# Patient Record
Sex: MALE | Race: BLACK OR AFRICAN AMERICAN | Employment: UNEMPLOYED | URBAN - METROPOLITAN AREA
[De-identification: names, ages, dates, MRNs, and addresses within clinical notes are randomized per-mention and may not be internally consistent; named-entity substitution may affect disease eponyms.]

---

## 2017-01-05 ENCOUNTER — HOSPITAL ENCOUNTER (OUTPATIENT)
Dept: MRI IMAGING | Age: 62
Discharge: HOME | End: 2017-01-05
Attending: INTERNAL MEDICINE
Payer: COMMERCIAL

## 2017-01-05 DIAGNOSIS — M51.37 DEGENERATIVE DISC DISEASE AT L5-S1 LEVEL: ICD-10-CM

## 2017-01-05 PROCEDURE — 72148 MRI LUMBAR SPINE W/O DYE: CPT

## 2017-05-04 ENCOUNTER — DOCUMENTATION ONLY (OUTPATIENT)
Dept: FAMILY MEDICINE CLINIC | Age: 62
End: 2017-05-04

## 2017-05-10 ENCOUNTER — OFFICE VISIT (OUTPATIENT)
Dept: FAMILY MEDICINE CLINIC | Age: 62
End: 2017-05-10

## 2017-05-10 ENCOUNTER — HOSPITAL ENCOUNTER (OUTPATIENT)
Dept: LAB | Age: 62
Discharge: HOME | End: 2017-05-10
Payer: COMMERCIAL

## 2017-05-10 VITALS
DIASTOLIC BLOOD PRESSURE: 105 MMHG | HEIGHT: 74 IN | BODY MASS INDEX: 24.77 KG/M2 | WEIGHT: 193 LBS | OXYGEN SATURATION: 95 % | TEMPERATURE: 96.1 F | SYSTOLIC BLOOD PRESSURE: 150 MMHG | RESPIRATION RATE: 18 BRPM | HEART RATE: 112 BPM

## 2017-05-10 DIAGNOSIS — M51.37 DEGENERATIVE DISC DISEASE AT L5-S1 LEVEL: ICD-10-CM

## 2017-05-10 DIAGNOSIS — R39.15 URINARY URGENCY: Primary | ICD-10-CM

## 2017-05-10 DIAGNOSIS — M54.41 CHRONIC MIDLINE LOW BACK PAIN WITH RIGHT-SIDED SCIATICA: ICD-10-CM

## 2017-05-10 DIAGNOSIS — I10 ESSENTIAL HYPERTENSION WITH GOAL BLOOD PRESSURE LESS THAN 140/90: ICD-10-CM

## 2017-05-10 DIAGNOSIS — G89.29 CHRONIC MIDLINE LOW BACK PAIN WITH RIGHT-SIDED SCIATICA: ICD-10-CM

## 2017-05-10 DIAGNOSIS — R07.9 CHEST PAIN, UNSPECIFIED TYPE: ICD-10-CM

## 2017-05-10 DIAGNOSIS — F32.2 SEVERE SINGLE CURRENT EPISODE OF MAJOR DEPRESSIVE DISORDER, WITHOUT PSYCHOTIC FEATURES (HCC): ICD-10-CM

## 2017-05-10 DIAGNOSIS — R25.2 MUSCLE CRAMPS: ICD-10-CM

## 2017-05-10 LAB
ALBUMIN SERPL BCP-MCNC: 3.7 G/DL (ref 3.4–5)
ALBUMIN/GLOB SERPL: 0.9 {RATIO} (ref 0.8–1.7)
ALP SERPL-CCNC: 97 U/L (ref 45–117)
ALT SERPL-CCNC: 33 U/L (ref 16–61)
ANION GAP BLD CALC-SCNC: 8 MMOL/L (ref 3–18)
AST SERPL W P-5'-P-CCNC: 20 U/L (ref 15–37)
BASOPHILS # BLD AUTO: 0 K/UL (ref 0–0.06)
BASOPHILS # BLD: 0 % (ref 0–2)
BILIRUB SERPL-MCNC: 0.4 MG/DL (ref 0.2–1)
BUN SERPL-MCNC: 19 MG/DL (ref 7–18)
BUN/CREAT SERPL: 15 (ref 12–20)
CALCIUM SERPL-MCNC: 9.8 MG/DL (ref 8.5–10.1)
CHLORIDE SERPL-SCNC: 99 MMOL/L (ref 100–108)
CO2 SERPL-SCNC: 31 MMOL/L (ref 21–32)
CREAT SERPL-MCNC: 1.27 MG/DL (ref 0.6–1.3)
DIFFERENTIAL METHOD BLD: ABNORMAL
EOSINOPHIL # BLD: 0.2 K/UL (ref 0–0.4)
EOSINOPHIL NFR BLD: 3 % (ref 0–5)
ERYTHROCYTE [DISTWIDTH] IN BLOOD BY AUTOMATED COUNT: 14 % (ref 11.6–14.5)
GLOBULIN SER CALC-MCNC: 4.3 G/DL (ref 2–4)
GLUCOSE SERPL-MCNC: 107 MG/DL (ref 74–99)
HCT VFR BLD AUTO: 48.9 % (ref 36–48)
HGB BLD-MCNC: 16.1 G/DL (ref 13–16)
LYMPHOCYTES # BLD AUTO: 31 % (ref 21–52)
LYMPHOCYTES # BLD: 2.2 K/UL (ref 0.9–3.6)
MCH RBC QN AUTO: 30.7 PG (ref 24–34)
MCHC RBC AUTO-ENTMCNC: 32.9 G/DL (ref 31–37)
MCV RBC AUTO: 93.3 FL (ref 74–97)
MONOCYTES # BLD: 0.4 K/UL (ref 0.05–1.2)
MONOCYTES NFR BLD AUTO: 6 % (ref 3–10)
NEUTS SEG # BLD: 4.2 K/UL (ref 1.8–8)
NEUTS SEG NFR BLD AUTO: 60 % (ref 40–73)
PLATELET # BLD AUTO: 340 K/UL (ref 135–420)
PMV BLD AUTO: 8.5 FL (ref 9.2–11.8)
POTASSIUM SERPL-SCNC: 4.5 MMOL/L (ref 3.5–5.5)
PROT SERPL-MCNC: 8 G/DL (ref 6.4–8.2)
RBC # BLD AUTO: 5.24 M/UL (ref 4.7–5.5)
SODIUM SERPL-SCNC: 138 MMOL/L (ref 136–145)
WBC # BLD AUTO: 7 K/UL (ref 4.6–13.2)

## 2017-05-10 PROCEDURE — 85025 COMPLETE CBC W/AUTO DIFF WBC: CPT | Performed by: INTERNAL MEDICINE

## 2017-05-10 PROCEDURE — 36415 COLL VENOUS BLD VENIPUNCTURE: CPT | Performed by: INTERNAL MEDICINE

## 2017-05-10 PROCEDURE — 80053 COMPREHEN METABOLIC PANEL: CPT | Performed by: INTERNAL MEDICINE

## 2017-05-10 RX ORDER — DULOXETINE 40 MG/1
40 CAPSULE, DELAYED RELEASE ORAL DAILY
Qty: 90 CAP | Refills: 2 | Status: SHIPPED | OUTPATIENT
Start: 2017-05-10 | End: 2017-05-31 | Stop reason: SDUPTHER

## 2017-05-10 RX ORDER — BACLOFEN 10 MG/1
10 TABLET ORAL 3 TIMES DAILY
Qty: 270 TAB | Refills: 2 | Status: SHIPPED | OUTPATIENT
Start: 2017-05-10 | End: 2017-05-31 | Stop reason: SDUPTHER

## 2017-05-10 RX ORDER — FINASTERIDE 5 MG/1
5 TABLET, FILM COATED ORAL DAILY
Qty: 90 TAB | Refills: 2 | Status: SHIPPED | OUTPATIENT
Start: 2017-05-10 | End: 2017-05-31 | Stop reason: SDUPTHER

## 2017-05-10 RX ORDER — ACETAMINOPHEN 500 MG
1000 TABLET ORAL
Qty: 240 TAB | Refills: 2 | Status: SHIPPED | OUTPATIENT
Start: 2017-05-10 | End: 2017-05-31 | Stop reason: SDUPTHER

## 2017-05-10 RX ORDER — ETODOLAC 400 MG/1
400 TABLET, FILM COATED ORAL 2 TIMES DAILY WITH MEALS
Qty: 100 TAB | Refills: 5 | Status: SHIPPED | OUTPATIENT
Start: 2017-05-10 | End: 2017-05-31 | Stop reason: SDUPTHER

## 2017-05-10 RX ORDER — PREDNISONE 10 MG/1
TABLET ORAL
Qty: 30 TAB | Refills: 0 | Status: SHIPPED | OUTPATIENT
Start: 2017-05-10 | End: 2017-05-31 | Stop reason: SDUPTHER

## 2017-05-10 RX ORDER — LISINOPRIL AND HYDROCHLOROTHIAZIDE 20; 25 MG/1; MG/1
1 TABLET ORAL DAILY
Qty: 90 TAB | Refills: 2 | Status: SHIPPED | OUTPATIENT
Start: 2017-05-10 | End: 2017-05-31 | Stop reason: SDUPTHER

## 2017-05-10 NOTE — PROGRESS NOTES
Patient presents to clinic for routine follow up. Advance Directive:    1. Do you have an advance directive in place? Patient Reply:     2. If not, would you like material regarding how to put one in place? Patient Reply:      Coordination of Care:    1. Have you been to the ER, urgent care clinic since your last visit? Hospitalized since your last visit? No    2. Have you seen or consulted any other health care providers outside of the [address] [place] [address] since your last visit? Include any pap smears or colon screening. No    Depression Screening completed. Learning Assessment completed. Abuse Screening completed. Health Maintenance reviewed and discussed per provider.

## 2017-05-10 NOTE — PROGRESS NOTES
History of Present Illness  John Guthrie is a 60 y.o. male who presents today for management of    Chief Complaint   Patient presents with    Hypertension    Back Pain    Depression       Patient reports of constant low back pain, intensity 10/10 at its worst. He also has pain on the right posterior hip and shoots down right thigh. He fell 4-5 times because of the pain. It is associated with numbness on his right toes and right lower lateral leg. No loss of bowel or bladder control. He reports of urinary urgency. No weak flow or dribbling. He denies any headaches, dizziness, chest pain, ARROYO. He complains of gas and bloating. He feels like he is having a heart attack when it happens. Pain is not related to activity.     PHQ over the last two weeks 5/10/2017   PHQ Not Done -   Little interest or pleasure in doing things Nearly every day   Feeling down, depressed or hopeless Several days   Total Score PHQ 2 4   Trouble falling or staying asleep, or sleeping too much Nearly every day   Feeling tired or having little energy More than half the days   Poor appetite or overeating Several days   Feeling bad about yourself - or that you are a failure or have let yourself or your family down More than half the days   Trouble concentrating on things such as school, work, reading or watching TV Nearly every day   Moving or speaking so slowly that other people could have noticed; or the opposite being so fidgety that others notice Not at all   Thoughts of being better off dead, or hurting yourself in some way Not at all   PHQ 9 Score 15   How difficult have these problems made it for you to do your work, take care of your home and get along with others Somewhat difficult         Problem List  Patient Active Problem List    Diagnosis Date Noted    Degenerative disc disease at L5-S1 level 12/27/2016    PTSD (post-traumatic stress disorder) 11/29/2016    Essential hypertension with goal blood pressure less than 140/90 06/28/2016    Severe single current episode of major depressive disorder, without psychotic features ([facility] 06/28/2016    Chronic midline low back pain with right-sided sciatica 06/28/2016    Nicotine abuse 06/28/2016       Past Medical History  Past Medical History:   Diagnosis Date    Hypertension     PTSD (post-traumatic stress disorder)         Surgical History  Past Surgical History:   Procedure Laterality Date    HX HERNIA REPAIR          Current Medications  Current Outpatient Prescriptions   Medication Sig    finasteride (PROSCAR) 5 mg tablet Take 1 Tab by mouth daily.  lisinopril-hydroCHLOROthiazide (PRINZIDE, ZESTORETIC) 20-25 mg per tablet Take 1 Tab by mouth daily.  baclofen (LIORESAL) 10 mg tablet Take 1 Tab by mouth three (3) times daily.  etodolac (LODINE) 400 mg tablet Take 400 mg by mouth two (2) times daily (with meals).  acetaminophen (TYLENOL EXTRA STRENGTH) 500 mg tablet Take 2 Tabs by mouth every six (6) hours as needed for Pain.  predniSONE (DELTASONE) 10 mg tablet 4 per day x 3 days, then 3 per day x 3 days, then 2 per day x 3 days, then 1 per day x 3 days, then DC    DULoxetine 40 mg cpDR Take 40 mg by mouth daily.  sildenafil citrate (VIAGRA) 100 mg tablet Take 1 Tab by mouth as needed. No current facility-administered medications for this visit. Allergies/Drug Reactions  Allergies   Allergen Reactions    Codeine Swelling        Family History  Family History   Problem Relation Age of Onset    Hypertension Mother     Hypertension Sister         Social History  Social History     Social History    Marital status: SINGLE     Spouse name: N/A    Number of children: N/A    Years of education: N/A     Occupational History    Not on file.      Social History Main Topics    Smoking status: Current Every Day Smoker     Packs/day: 0.50     Years: 15.00    Smokeless tobacco: Never Used    Alcohol use No    Drug use: No    Sexual activity: Yes Partners: Female     Other Topics Concern    Not on file     Social History Narrative       Review of Systems  General ROS: negative for - chills or fever  Respiratory ROS: no cough, shortness of breath, or wheezing  Cardiovascular ROS: no chest pain or dyspnea on exertion  Gastrointestinal ROS: positive for - gas/bloating and heartburn  negative for - appetite loss, blood in stools or change in bowel habits  Genito-Urinary ROS: no dysuria, trouble voiding, or hematuria  positive for - urinary frequency/urgency  Musculoskeletal ROS: positive for - back pain  Neurological ROS: negative for - numbness/tingling or weakness      Physical Exam  Vital signs:   Vitals:    05/10/17 0953   BP: (!) 150/105   Pulse: (!) 112   Resp: 18   Temp: 96.1 °F (35.6 °C)   TempSrc: Oral   SpO2: 95%   Weight: 193 lb (87.5 kg)   Height: 6' 2\" (1.88 m)       General: alert, oriented, not in distress  Chest/Lungs: clear breath sounds, no wheezing or crackles  Heart: normal rate, regular rhythm, no murmur  Abdomen: soft, non-distended, non-tender, normal bowel sounds, no organomegaly, no masses  Extremities: no focal deformities, no edema  Lumbar: No rash, ecchymosis, or gross obliquity. No fasciculations. No focal atrophy is noted. (+)  tenderness to palpation over right lumbar area. SI joints non-tender. Trochanters non tender. Straight leg raising positive on the right at 30 degrees. Musculoskeletal: strength 5/5 on both upper and lower extremities. Sensation in the bilateral arms grossly intact to light touch. Sensation in the bilateral legs grossly intact to light touch. Assessment/Plan:      1. Urinary urgency  - start finasteride (PROSCAR) 5 mg tablet; Take 1 Tab by mouth daily. Dispense: 90 Tab; Refill: 2    2. Essential hypertension with goal blood pressure less than 140/90  - poorly controlled  - increase lisinopril-hydroCHLOROthiazide (PRINZIDE, ZESTORETIC) 20-25 mg per tablet; Take 1 Tab by mouth daily.   Dispense: 90 Tab; Refill: 2  - CBC WITH AUTOMATED DIFF; Future  - METABOLIC PANEL, COMPREHENSIVE; Future    3. Chronic midline low back pain with right-sided sciatica  - refilled  etodolac (LODINE) 400 mg tablet; Take 400 mg by mouth two (2) times daily (with meals). Dispense: 100 Tab; Refill: 5  - start acetaminophen (TYLENOL EXTRA STRENGTH) 500 mg tablet; Take 2 Tabs by mouth every six (6) hours as needed for Pain. Dispense: 240 Tab; Refill: 2  - predniSONE (DELTASONE) 10 mg tablet; 4 per day x 3 days, then 3 per day x 3 days, then 2 per day x 3 days, then 1 per day x 3 days, then DC  Dispense: 30 Tab; Refill: 0  - [facility]    4. Degenerative disc disease at L5-S1 level  - acetaminophen (TYLENOL EXTRA STRENGTH) 500 mg tablet; Take 2 Tabs by mouth every six (6) hours as needed for Pain. Dispense: 240 Tab; Refill: 2  - predniSONE (DELTASONE) 10 mg tablet; 4 per day x 3 days, then 3 per day x 3 days, then 2 per day x 3 days, then 1 per day x 3 days, then DC  Dispense: 30 Tab; Refill: 0    5. Chest pain, unspecified type  - EKG, 12 LEAD, INITIAL; Future - sinus tachycardia, no ST-T changes  - REFERRAL TO CARDIOLOGY    6. Muscle cramps  - baclofen (LIORESAL) 10 mg tablet; Take 1 Tab by mouth three (3) times daily. Dispense: 270 Tab; Refill: 2    7. Depression  - increase cymbalta to 40mg daily      Follow-up Disposition:  Return in about 4 weeks (around 6/7/2017) for htn, pain, depression. I have discussed the diagnosis with the patient and the intended plan as seen in the above orders. The patient has received an after-visit summary and questions were answered concerning future plans. I have discussed medication side effects and warnings with the patient as well. I have reviewed the plan of care with the patient, accepted their input and they are in agreement with the treatment goals.        Wenceslao Diaz MD  May 10, 2017

## 2017-05-10 NOTE — MR AVS SNAPSHOT
Visit Information Date & Time Provider Department Dept. Phone Encounter #  
 5/10/2017 10:30 AM Nathaniel Shepherd, Silverio Gonzales [id number] Follow-up Instructions Return in about 4 weeks (around 6/7/2017) for htn, pain, depression. Your Appointments 5/10/2017 10:30 AM  
Follow Up with Nathaniel Shepherd MD  
[facility] Note: 4 month f/u os as needed for depression,pain rescheduled follow up; [facility] 4/28/2017 ce; Pt was a No Show 5/1/17. Letter #1 sent 5/4/17DMA/mbs.; pt r/s from 05/01/2017; PT CONFIRMED APPT. [facility] [address] [place] [address] [phone] [facility] [address] [facility] Upcoming Health Maintenance Date Due Hepatitis C Screening 1955 Pneumococcal 19-64 Medium Risk (1 of 1 - PPSV23) 1/7/1973 DTaP/Tdap/Td series (1 - Tdap) 1/7/1975 FOBT Q 1 YEAR AGE 50-75 1/7/2004 ZOSTER VACCINE AGE 60> 1/7/2014 INFLUENZA AGE 9 TO ADULT 8/1/2017 Allergies as of 5/10/2017  Review Complete On: 5/10/2017 By: Nathaniel Shepherd MD  
  
 Severity Noted Reaction Type Reactions Codeine  06/28/2016    Swelling Current Immunizations  Never Reviewed No immunizations on file. Not reviewed this visit You Were Diagnosed With   
  
 Codes Comments Urinary urgency    -  Primary ICD-10-CM: R39.15 ICD-9-CM: 993.12 Essential hypertension with goal blood pressure less than 140/90     ICD-10-CM: I10 
ICD-9-CM: 401.9 Chronic midline low back pain with right-sided sciatica     ICD-10-CM: M54.41, G89.29 ICD-9-CM: 724.2, 724.3, 338.29 Degenerative disc disease at L5-S1 level     ICD-10-CM: M51.36 
ICD-9-CM: 722.52 Chest pain, unspecified type     ICD-10-CM: R07.9 ICD-9-CM: 786.50 Muscle cramps     ICD-10-CM: R25.2 ICD-9-CM: 729.82  Severe single current episode of major depressive disorder, without psychotic features ([facility]     ICD-10-CM: F32.2 ICD-9-CM: 296.23 Vitals BP Pulse Temp Resp Height(growth percentile) Weight(growth percentile) (!) 150/105 (BP 1 Location: Right arm, BP Patient Position: Sitting) (!) 112 96.1 °F (35.6 °C) (Oral) 18 6' 2\" (1.88 m) 193 lb (87.5 kg) SpO2 BMI Smoking Status 95% 24.78 kg/m2 Current Every Day Smoker Vitals History BMI and BSA Data Body Mass Index Body Surface Area 24.78 kg/m 2 2.14 m 2 Your Updated Medication List  
  
   
This list is accurate as of: 5/10/17 10:23 AM.  Always use your most recent med list.  
  
  
  
  
 acetaminophen 500 mg tablet Commonly known as:  [address] Take 2 Tabs by mouth every six (6) hours as needed for Pain. baclofen 10 mg tablet Commonly known as:  LIORESAL Take 1 Tab by mouth three (3) times daily. DULoxetine 40 mg Cpdr  
Take 40 mg by mouth daily. etodolac 400 mg tablet Commonly known as:  LODINE Take 400 mg by mouth two (2) times daily (with meals). finasteride 5 mg tablet Commonly known as:  PROSCAR Take 1 Tab by mouth daily. lisinopril-hydroCHLOROthiazide 20-25 mg per tablet Commonly known as:  Regulo Flurry Take 1 Tab by mouth daily. predniSONE 10 mg tablet Commonly known as:  DELTASONE  
4 per day x 3 days, then 3 per day x 3 days, then 2 per day x 3 days, then 1 per day x 3 days, then DC  
  
 sildenafil citrate 100 mg tablet Commonly known as:  VIAGRA Take 1 Tab by mouth as needed. Prescriptions Printed Refills  
 finasteride (PROSCAR) 5 mg tablet 2 Sig: Take 1 Tab by mouth daily. Class: Print Route: Oral  
 lisinopril-hydroCHLOROthiazide (PRINZIDE, ZESTORETIC) 20-25 mg per tablet 2 Sig: Take 1 Tab by mouth daily. Class: Print Route: Oral  
 baclofen (LIORESAL) 10 mg tablet 2 Sig: Take 1 Tab by mouth three (3) times daily. Class: Print  Route: Oral  
 etodolac (LODINE) 400 mg tablet 5 Sig: Take 400 mg by mouth two (2) times daily (with meals). Class: Print Route: Oral  
 acetaminophen (TYLENOL EXTRA STRENGTH) 500 mg tablet 2 Sig: Take 2 Tabs by mouth every six (6) hours as needed for Pain. Class: Print Route: Oral  
 predniSONE (DELTASONE) 10 mg tablet 0 Si per day x 3 days, then 3 per day x 3 days, then 2 per day x 3 days, then 1 per day x 3 days, then DC Class: Print DULoxetine 40 mg cpDR 2 Sig: Take 40 mg by mouth daily. Class: Print Route: Oral  
  
We Performed the Following REFERRAL TO CARDIOLOGY [ZNE33 Custom] Comments:  
 62/M with history of HTN, chronic smoker with chest pain. Follow-up Instructions Return in about 4 weeks (around 2017) for htn, pain, depression. To-Do List   
 05/10/2017 Lab:  CBC WITH AUTOMATED DIFF   
  
 05/10/2017 ECG:  EKG, 12 LEAD, INITIAL   
  
 05/10/2017 Lab:  METABOLIC PANEL, COMPREHENSIVE Referral Information Referral ID Referred By Referred To  
  
 [id number] Arthur Infante Not Available Visits Status Start Date End Date 1 New Request 5/10/17 5/10/18 If your referral has a status of pending review or denied, additional information will be sent to support the outcome of this decision. [facility] & HEALTH SERVICES! Dear Severa Ingle: Thank you for requesting a [employer] account. Our records indicate that you already have an active [employer] account. You can access your account anytime at [email or web address] Did you know that you can access your hospital and ER discharge instructions at any time in [employer]? You can also review all of your test results from your hospital stay or ER visit. Additional Information If you have questions, please visit the Frequently Asked Questions section of the [employer] website at [email or web address] Remember, MyChart is NOT to be used for urgent needs. For medical emergencies, dial 911. Now available from your iPhone and Android! Please provide this summary of care documentation to your next provider. Your primary care clinician is listed as Wili Polanco. If you have any questions after today's visit, please call [phone].

## 2017-05-31 DIAGNOSIS — R25.2 MUSCLE CRAMPS: ICD-10-CM

## 2017-05-31 DIAGNOSIS — F32.2 SEVERE SINGLE CURRENT EPISODE OF MAJOR DEPRESSIVE DISORDER, WITHOUT PSYCHOTIC FEATURES (HCC): ICD-10-CM

## 2017-05-31 DIAGNOSIS — I10 ESSENTIAL HYPERTENSION WITH GOAL BLOOD PRESSURE LESS THAN 140/90: ICD-10-CM

## 2017-05-31 DIAGNOSIS — G89.29 CHRONIC MIDLINE LOW BACK PAIN WITH RIGHT-SIDED SCIATICA: ICD-10-CM

## 2017-05-31 DIAGNOSIS — R39.15 URINARY URGENCY: ICD-10-CM

## 2017-05-31 DIAGNOSIS — M54.41 CHRONIC MIDLINE LOW BACK PAIN WITH RIGHT-SIDED SCIATICA: ICD-10-CM

## 2017-05-31 DIAGNOSIS — M51.37 DEGENERATIVE DISC DISEASE AT L5-S1 LEVEL: ICD-10-CM

## 2017-05-31 RX ORDER — ACETAMINOPHEN 500 MG
1000 TABLET ORAL
Qty: 240 TAB | Refills: 2 | Status: SHIPPED | OUTPATIENT
Start: 2017-05-31 | End: 2017-06-07 | Stop reason: SDUPTHER

## 2017-05-31 RX ORDER — DULOXETINE 40 MG/1
40 CAPSULE, DELAYED RELEASE ORAL DAILY
Qty: 90 CAP | Refills: 2 | Status: SHIPPED | OUTPATIENT
Start: 2017-05-31 | End: 2017-06-07 | Stop reason: SDUPTHER

## 2017-05-31 RX ORDER — PREDNISONE 10 MG/1
TABLET ORAL
Qty: 30 TAB | Refills: 0 | Status: SHIPPED | OUTPATIENT
Start: 2017-05-31 | End: 2017-06-07 | Stop reason: SDUPTHER

## 2017-05-31 RX ORDER — FINASTERIDE 5 MG/1
5 TABLET, FILM COATED ORAL DAILY
Qty: 90 TAB | Refills: 2 | Status: SHIPPED | OUTPATIENT
Start: 2017-05-31 | End: 2017-06-07 | Stop reason: SDUPTHER

## 2017-05-31 RX ORDER — ETODOLAC 400 MG/1
400 TABLET, FILM COATED ORAL 2 TIMES DAILY WITH MEALS
Qty: 100 TAB | Refills: 5 | Status: SHIPPED | OUTPATIENT
Start: 2017-05-31 | End: 2017-06-07 | Stop reason: SDUPTHER

## 2017-05-31 RX ORDER — BACLOFEN 10 MG/1
10 TABLET ORAL 3 TIMES DAILY
Qty: 270 TAB | Refills: 2 | Status: SHIPPED | OUTPATIENT
Start: 2017-05-31 | End: 2017-06-07 | Stop reason: SDUPTHER

## 2017-05-31 RX ORDER — LISINOPRIL AND HYDROCHLOROTHIAZIDE 20; 25 MG/1; MG/1
1 TABLET ORAL DAILY
Qty: 90 TAB | Refills: 2 | Status: SHIPPED | OUTPATIENT
Start: 2017-05-31 | End: 2017-08-14 | Stop reason: SDUPTHER

## 2017-06-07 ENCOUNTER — OFFICE VISIT (OUTPATIENT)
Dept: FAMILY MEDICINE CLINIC | Age: 62
End: 2017-06-07

## 2017-06-07 ENCOUNTER — HOSPITAL ENCOUNTER (OUTPATIENT)
Dept: LAB | Age: 62
Discharge: HOME | End: 2017-06-07
Payer: COMMERCIAL

## 2017-06-07 VITALS
HEIGHT: 74 IN | HEART RATE: 100 BPM | TEMPERATURE: 96.3 F | DIASTOLIC BLOOD PRESSURE: 93 MMHG | BODY MASS INDEX: 24.97 KG/M2 | WEIGHT: 194.6 LBS | OXYGEN SATURATION: 96 % | RESPIRATION RATE: 20 BRPM | SYSTOLIC BLOOD PRESSURE: 146 MMHG

## 2017-06-07 DIAGNOSIS — R39.15 URINARY URGENCY: ICD-10-CM

## 2017-06-07 DIAGNOSIS — I10 ESSENTIAL HYPERTENSION WITH GOAL BLOOD PRESSURE LESS THAN 140/90: Primary | ICD-10-CM

## 2017-06-07 DIAGNOSIS — F32.2 SEVERE SINGLE CURRENT EPISODE OF MAJOR DEPRESSIVE DISORDER, WITHOUT PSYCHOTIC FEATURES (HCC): ICD-10-CM

## 2017-06-07 DIAGNOSIS — G89.29 CHRONIC MIDLINE LOW BACK PAIN WITH RIGHT-SIDED SCIATICA: ICD-10-CM

## 2017-06-07 DIAGNOSIS — M51.37 DEGENERATIVE DISC DISEASE AT L5-S1 LEVEL: ICD-10-CM

## 2017-06-07 DIAGNOSIS — F32.4 MAJOR DEPRESSIVE DISORDER WITH SINGLE EPISODE, IN PARTIAL REMISSION (HCC): ICD-10-CM

## 2017-06-07 DIAGNOSIS — M54.41 CHRONIC MIDLINE LOW BACK PAIN WITH RIGHT-SIDED SCIATICA: ICD-10-CM

## 2017-06-07 DIAGNOSIS — J41.0 SIMPLE CHRONIC BRONCHITIS (HCC): ICD-10-CM

## 2017-06-07 DIAGNOSIS — R25.2 MUSCLE CRAMPS: ICD-10-CM

## 2017-06-07 DIAGNOSIS — N28.9 KIDNEY LESION, NATIVE, RIGHT: ICD-10-CM

## 2017-06-07 LAB
APPEARANCE UR: CLEAR
BILIRUB UR QL: NEGATIVE
COLOR UR: YELLOW
GLUCOSE UR STRIP.AUTO-MCNC: NEGATIVE MG/DL
HGB UR QL STRIP: NEGATIVE
KETONES UR QL STRIP.AUTO: NEGATIVE MG/DL
LEUKOCYTE ESTERASE UR QL STRIP.AUTO: NEGATIVE
NITRITE UR QL STRIP.AUTO: NEGATIVE
PH UR STRIP: 6.5 [PH] (ref 5–8)
PROT UR STRIP-MCNC: NEGATIVE MG/DL
SP GR UR REFRACTOMETRY: 1.01 (ref 1–1.03)
UROBILINOGEN UR QL STRIP.AUTO: 0.2 EU/DL (ref 0.2–1)

## 2017-06-07 PROCEDURE — 81003 URINALYSIS AUTO W/O SCOPE: CPT | Performed by: INTERNAL MEDICINE

## 2017-06-07 RX ORDER — TRAMADOL HYDROCHLORIDE 50 MG/1
50 TABLET ORAL
Qty: 30 TAB | Refills: 0 | Status: SHIPPED | OUTPATIENT
Start: 2017-06-07 | End: 2017-06-14 | Stop reason: SINTOL

## 2017-06-07 RX ORDER — PREDNISONE 10 MG/1
TABLET ORAL
Qty: 30 TAB | Refills: 0 | Status: SHIPPED | OUTPATIENT
Start: 2017-06-07 | End: 2017-08-14 | Stop reason: ALTCHOICE

## 2017-06-07 RX ORDER — FINASTERIDE 5 MG/1
5 TABLET, FILM COATED ORAL DAILY
Qty: 90 TAB | Refills: 2 | Status: SHIPPED | OUTPATIENT
Start: 2017-06-07 | End: 2017-06-14 | Stop reason: SDUPTHER

## 2017-06-07 RX ORDER — DULOXETINE 40 MG/1
40 CAPSULE, DELAYED RELEASE ORAL DAILY
Qty: 90 CAP | Refills: 2 | Status: SHIPPED | OUTPATIENT
Start: 2017-06-07 | End: 2017-11-13

## 2017-06-07 RX ORDER — ETODOLAC 400 MG/1
400 TABLET, FILM COATED ORAL 2 TIMES DAILY WITH MEALS
Qty: 100 TAB | Refills: 5 | Status: SHIPPED | OUTPATIENT
Start: 2017-06-07 | End: 2017-11-13 | Stop reason: SDUPTHER

## 2017-06-07 RX ORDER — BUDESONIDE AND FORMOTEROL FUMARATE DIHYDRATE 160; 4.5 UG/1; UG/1
2 AEROSOL RESPIRATORY (INHALATION) 2 TIMES DAILY
Qty: 1 INHALER | Refills: 5 | Status: SHIPPED | OUTPATIENT
Start: 2017-06-07 | End: 2017-11-13 | Stop reason: SDUPTHER

## 2017-06-07 RX ORDER — LEVOFLOXACIN 500 MG/1
500 TABLET, FILM COATED ORAL DAILY
Qty: 5 TAB | Refills: 0 | Status: SHIPPED | OUTPATIENT
Start: 2017-06-07 | End: 2017-06-12

## 2017-06-07 RX ORDER — ALBUTEROL SULFATE 90 UG/1
2 AEROSOL, METERED RESPIRATORY (INHALATION)
Qty: 1 INHALER | Refills: 12 | Status: SHIPPED | OUTPATIENT
Start: 2017-06-07 | End: 2017-11-13 | Stop reason: SDUPTHER

## 2017-06-07 RX ORDER — BACLOFEN 10 MG/1
10 TABLET ORAL 3 TIMES DAILY
Qty: 270 TAB | Refills: 2 | Status: SHIPPED | OUTPATIENT
Start: 2017-06-07 | End: 2017-08-14

## 2017-06-07 RX ORDER — ACETAMINOPHEN 500 MG
1000 TABLET ORAL
Qty: 240 TAB | Refills: 2 | Status: SHIPPED | OUTPATIENT
Start: 2017-06-07

## 2017-06-07 NOTE — MR AVS SNAPSHOT
Visit Information Date & Time Provider Department Dept. Phone Encounter #  
 6/7/2017 10:30 AM Dora Cook[address]  [id number] Follow-up Instructions Return in about 1 week (around 6/14/2017) for complete physical.  
 Follow-up and Disposition History Upcoming Health Maintenance Date Due Hepatitis C Screening 1955 Pneumococcal 19-64 Medium Risk (1 of 1 - PPSV23) 1/7/1973 DTaP/Tdap/Td series (1 - Tdap) 1/7/1975 FOBT Q 1 YEAR AGE 50-75 1/7/2004 ZOSTER VACCINE AGE 60> 1/7/2014 INFLUENZA AGE 9 TO ADULT 8/1/2017 Allergies as of 6/7/2017  Review Complete On: 6/7/2017 By: Dora Cook MD  
  
 Severity Noted Reaction Type Reactions Codeine  06/28/2016    Swelling Current Immunizations  Never Reviewed No immunizations on file. Not reviewed this visit You Were Diagnosed With   
  
 Codes Comments Essential hypertension with goal blood pressure less than 140/90    -  Primary ICD-10-CM: I10 
ICD-9-CM: 401.9 Degenerative disc disease at L5-S1 level     ICD-10-CM: M51.36 
ICD-9-CM: 722.52 Chronic midline low back pain with right-sided sciatica     ICD-10-CM: M54.41, G89.29 ICD-9-CM: 724.2, 724.3, 338.29 Major depressive disorder with single episode, [facility]     ICD-10-CM: F32.4 ICD-9-CM: 296.25 Urinary urgency     ICD-10-CM: R39.15 ICD-9-CM: 940.27 Kidney lesion, native, right     ICD-10-CM: N28.9 ICD-9-CM: 593.9 Simple chronic bronchitis (HCC)     ICD-10-CM: J41.0 ICD-9-CM: 491.0 Muscle cramps     ICD-10-CM: R25.2 ICD-9-CM: 729.82 Severe single current episode of major depressive disorder, without psychotic features ([facility]     ICD-10-CM: F32.2 ICD-9-CM: 296.23 Vitals BP Pulse Temp Resp Height(growth percentile) Weight(growth percentile) (!) 146/93 100 96.3 °F (35.7 °C) (Oral) 20 6' 2\" (1.88 m) 194 lb 9.6 oz (88.3 kg) SpO2 BMI Smoking Status 96% 24.99 kg/m2 Current Every Day Smoker BMI and BSA Data Body Mass Index Body Surface Area 24.99 kg/m 2 2.15 m 2 Your Updated Medication List  
  
   
This list is accurate as of: 6/7/17 10:48 AM.  Always use your most recent med list.  
  
  
  
  
 acetaminophen 500 mg tablet Commonly known as:  [address] Take 2 Tabs by mouth every six (6) hours as needed for Pain. albuterol 90 mcg/actuation inhaler Commonly known as:  PROVENTIL HFA, VENTOLIN HFA, PROAIR HFA Take 2 Puffs by inhalation every six (6) hours as needed for Wheezing or Shortness of Breath. baclofen 10 mg tablet Commonly known as:  LIORESAL Take 1 Tab by mouth three (3) times daily. budesonide-formoterol 160-4.5 mcg/actuation HFA inhaler Commonly known as:  SYMBICORT Take 2 Puffs by inhalation two (2) times a day. DULoxetine 40 mg Cpdr  
Take 40 mg by mouth daily. etodolac 400 mg tablet Commonly known as:  LODINE Take 400 mg by mouth two (2) times daily (with meals). finasteride 5 mg tablet Commonly known as:  PROSCAR Take 1 Tab by mouth daily. levoFLOXacin 500 mg tablet Commonly known as:  Chick Haggis Take 1 Tab by mouth daily for 5 days. lisinopril-hydroCHLOROthiazide 20-25 mg per tablet Commonly known as:  Guillermo Stall Take 1 Tab by mouth daily. predniSONE 10 mg tablet Commonly known as:  DELTASONE  
4 per day x 3 days, then 3 per day x 3 days, then 2 per day x 3 days, then 1 per day x 3 days, then DC  
  
 sildenafil citrate 100 mg tablet Commonly known as:  VIAGRA Take 1 Tab by mouth as needed. traMADol 50 mg tablet Commonly known as:  ULTRAM  
Take 1 Tab by mouth every eight (8) hours as needed for Pain. Max Daily Amount: 150 mg.  
  
  
  
  
Prescriptions Printed  Refills  
 budesonide-formoterol (SYMBICORT) 160-4.5 mcg/actuation HFA inhaler 5  
 Sig: Take 2 Puffs by inhalation two (2) times a day. Class: Print Route: Inhalation  
 albuterol (PROVENTIL HFA, VENTOLIN HFA, PROAIR HFA) 90 mcg/actuation inhaler 12 Sig: Take 2 Puffs by inhalation every six (6) hours as needed for Wheezing or Shortness of Breath. Class: Print Route: Inhalation  
 etodolac (LODINE) 400 mg tablet 5 Sig: Take 400 mg by mouth two (2) times daily (with meals). Class: Print Route: Oral  
 acetaminophen (TYLENOL EXTRA STRENGTH) 500 mg tablet 2 Sig: Take 2 Tabs by mouth every six (6) hours as needed for Pain. Class: Print Route: Oral  
 predniSONE (DELTASONE) 10 mg tablet 0 Si per day x 3 days, then 3 per day x 3 days, then 2 per day x 3 days, then 1 per day x 3 days, then DC Class: Print  
 baclofen (LIORESAL) 10 mg tablet 2 Sig: Take 1 Tab by mouth three (3) times daily. Class: Print Route: Oral  
 finasteride (PROSCAR) 5 mg tablet 2 Sig: Take 1 Tab by mouth daily. Class: Print Route: Oral  
 DULoxetine 40 mg cpDR 2 Sig: Take 40 mg by mouth daily. Class: Print Route: Oral  
 levoFLOXacin (LEVAQUIN) 500 mg tablet 0 Sig: Take 1 Tab by mouth daily for 5 days. Class: Print Route: Oral  
 traMADol (ULTRAM) 50 mg tablet 0 Sig: Take 1 Tab by mouth every eight (8) hours as needed for Pain. Max Daily Amount: 150 mg.  
 Class: Print Route: Oral  
  
We Performed the Following REFERRAL TO PAIN MANAGEMENT [SMZ583 Custom] Comments:  
 62/M with chronic low back pain, abnormal lumbar MRI Follow-up Instructions Return in about 1 week (around 2017) for complete physical.  
  
To-Do List   
 Around 2017 Lab:  URINALYSIS W/ RFLX MICROSCOPIC   
  
 [address] [place] [address] Referral Information Referral ID Referred By Referred To  
  
 [id number] Brandon Clolazo Not Available Visits Status Start Date End Date 1 New Request 17 If your referral has a status of pending review or denied, additional information will be sent to support the outcome of this decision. Introducing [facility] SERVICES! Dear Itzel Judd: Thank you for requesting a [employer] account. Our records indicate that you already have an active [employer] account. You can access your account anytime at [email or web address] Did you know that you can access your hospital and ER discharge instructions at any time in [employer]? You can also review all of your test results from your hospital stay or ER visit. Additional Information If you have questions, please visit the Frequently Asked Questions section of the [employer] website at [email or web address] Remember, [employer] is NOT to be used for urgent needs. For medical emergencies, dial 911. Now available from your iPhone and Android! Please provide this summary of care documentation to your next provider. Your primary care clinician is listed as Adri Toribio. If you have any questions after today's visit, please call [phone].

## 2017-06-07 NOTE — PROGRESS NOTES
1. Have you been to the ER, urgent care clinic since your last visit? Hospitalized since your last visit? No    2. Have you seen or consulted any other health care providers outside of the [address] [place] [address] since your last visit? Include any pap smears or colon screening.  No

## 2017-06-07 NOTE — PROGRESS NOTES
History of Present Illness  Bradly Gibson is a 60 y.o. male who presents today for management of    Chief Complaint   Patient presents with    Follow-up     back pain    Back Pain       Patient states that he was not able to fill his prescriptions from the [place]. He as chronic low back pain. He has fallen a few times. He reports of having cough and mucus for one week. He also has occasional wheezing and dyspnea on exertion. He smokes one pack per day. He has been smoking for 20+ years. Problem List  Patient Active Problem List    Diagnosis Date Noted    Urinary urgency 06/07/2017    Degenerative disc disease at L5-S1 level 12/27/2016    PTSD (post-traumatic stress disorder) 11/29/2016    Essential hypertension with goal blood pressure less than 140/90 06/28/2016    Severe single current episode of major depressive disorder, without psychotic features ([facility] [address] 06/28/2016    Chronic midline low back pain with right-sided sciatica 06/28/2016    Nicotine abuse 06/28/2016       Past Medical History  Past Medical History:   Diagnosis Date    Hypertension     PTSD (post-traumatic stress disorder)         Surgical History  Past Surgical History:   Procedure Laterality Date    HX HERNIA REPAIR          Current Medications  Current Outpatient Prescriptions   Medication Sig    budesonide-formoterol (SYMBICORT) 160-4.5 mcg/actuation HFA inhaler Take 2 Puffs by inhalation two (2) times a day.  albuterol (PROVENTIL HFA, VENTOLIN HFA, PROAIR HFA) 90 mcg/actuation inhaler Take 2 Puffs by inhalation every six (6) hours as needed for Wheezing or Shortness of Breath.  etodolac (LODINE) 400 mg tablet Take 400 mg by mouth two (2) times daily (with meals).  acetaminophen (TYLENOL EXTRA STRENGTH) 500 mg tablet Take 2 Tabs by mouth every six (6) hours as needed for Pain.     predniSONE (DELTASONE) 10 mg tablet 4 per day x 3 days, then 3 per day x 3 days, then 2 per day x 3 days, then 1 per day x 3 days, then DC  baclofen (LIORESAL) 10 mg tablet Take 1 Tab by mouth three (3) times daily.  finasteride (PROSCAR) 5 mg tablet Take 1 Tab by mouth daily.  DULoxetine 40 mg cpDR Take 40 mg by mouth daily.  levoFLOXacin (LEVAQUIN) 500 mg tablet Take 1 Tab by mouth daily for 5 days.  lisinopril-hydroCHLOROthiazide (PRINZIDE, ZESTORETIC) 20-25 mg per tablet Take 1 Tab by mouth daily.  sildenafil citrate (VIAGRA) 100 mg tablet Take 1 Tab by mouth as needed. No current facility-administered medications for this visit. Allergies/Drug Reactions  Allergies   Allergen Reactions    Codeine Swelling        Family History  Family History   Problem Relation Age of Onset    Hypertension Mother     Hypertension Sister         Social History  Social History     Social History    Marital status: SINGLE     Spouse name: N/A    Number of children: N/A    Years of education: N/A     Occupational History    Not on file.      Social History Main Topics    Smoking status: Current Every Day Smoker     Packs/day: 0.50     Years: 15.00    Smokeless tobacco: Never Used    Alcohol use No    Drug use: No    Sexual activity: Yes     Partners: Female     Other Topics Concern    Not on file     Social History Narrative       Review of Systems  General ROS: negative for - chills or fever  Respiratory ROS: positive for - cough, sputum changes and wheezing  Cardiovascular ROS: negative for - chest pain or palpitations  Gastrointestinal ROS: no abdominal pain, change in bowel habits, or black or bloody stools  Genito-Urinary ROS: positive for - urinary frequency/urgency  Musculoskeletal ROS: positive for - back pain  Neurological ROS: negative for - dizziness or headaches      Physical Exam  Vital signs:   Vitals:    06/07/17 1021   BP: (!) 146/93   Pulse: 100   Resp: 20   Temp: 96.3 °F (35.7 °C)   TempSrc: Oral   SpO2: 96%   Weight: 194 lb 9.6 oz (88.3 kg)   Height: 6' 2\" (1.88 m)       General: alert, oriented, not in distress  Chest/Lungs: clear breath sounds, no wheezing or crackles  Heart: normal rate, regular rhythm, no murmur  Abdomen: soft, non-distended, non-tender, normal bowel sounds, no organomegaly, no masses  Extremities: no focal deformities, no edema    Laboratory/Tests:    Component      Latest Ref Rng & Units 5/10/2017 5/10/2017          10:42 AM 10:42 AM   WBC      4.6 - 13.2 K/uL 7.0    RBC      4.70 - 5.50 M/uL 5.24    HGB      13.0 - 16.0 g/dL 16.1 (H)    HCT      36.0 - 48.0 % 48.9 (H)    MCV      74.0 - 97.0 FL 93.3    MCH      24.0 - 34.0 PG 30.7    MCHC      31.0 - 37.0 g/dL 32.9    RDW      11.6 - 14.5 % 14.0    PLATELET      049 - 651 K/uL 340    MPV      9.2 - 11.8 FL 8.5 (L)    NEUTROPHILS      40 - 73 % 60    LYMPHOCYTES      21 - 52 % 31    MONOCYTES      3 - 10 % 6    EOSINOPHILS      0 - 5 % 3    BASOPHILS      0 - 2 % 0    ABS. NEUTROPHILS      1.8 - 8.0 K/UL 4.2    ABS. LYMPHOCYTES      0.9 - 3.6 K/UL 2.2    ABS. MONOCYTES      0.05 - 1.2 K/UL 0.4    ABS. EOSINOPHILS      0.0 - 0.4 K/UL 0.2    ABS. BASOPHILS      0.0 - 0.06 K/UL 0.0    DF       AUTOMATED    Sodium      136 - 145 mmol/L  138   Potassium      3.5 - 5.5 mmol/L  4.5   Chloride      100 - 108 mmol/L  99 (L)   CO2      21 - 32 mmol/L  31   Anion gap      3.0 - 18 mmol/L  8   Glucose      74 - 99 mg/dL  107 (H)   BUN      7.0 - 18 MG/DL  19 (H)   Creatinine      0.6 - 1.3 MG/DL  1.27   BUN/Creatinine ratio      12 - 20    15   GFR est AA      >60 ml/min/1.73m2  >60   GFR est non-AA      >60 ml/min/1.73m2  57 (L)   Calcium      8.5 - 10.1 MG/DL  9.8   Bilirubin, total      0.2 - 1.0 MG/DL  0.4   ALT      16 - 61 U/L  33   AST      15 - 37 U/L  20   Alk. phosphatase      45 - 117 U/L  97   Protein, total      6.4 - 8.2 g/dL  8.0   Albumin      3.4 - 5.0 g/dL  3.7   Globulin      2.0 - 4.0 g/dL  4.3 (H)   A-G Ratio      0.8 - 1.7    0.9     Assessment/Plan:      1.  Essential hypertension with goal blood pressure less than 140/90  - BP elevated today  - he states that he was not able to sleep last night  - continue current meds    2. Degenerative disc disease at L5-S1 level  - REFERRAL TO PAIN MANAGEMENT  - acetaminophen (TYLENOL EXTRA STRENGTH) 500 mg tablet; Take 2 Tabs by mouth every six (6) hours as needed for Pain. Dispense: 240 Tab; Refill: 2  - predniSONE (DELTASONE) 10 mg tablet; 4 per day x 3 days, then 3 per day x 3 days, then 2 per day x 3 days, then 1 per day x 3 days, then DC  Dispense: 30 Tab; Refill: 0  - given 30 pills of tramadol    3. Chronic midline low back pain with right-sided sciatica  - REFERRAL TO PAIN MANAGEMENT  - etodolac (LODINE) 400 mg tablet; Take 400 mg by mouth two (2) times daily (with meals). Dispense: 100 Tab; Refill: 5  - acetaminophen (TYLENOL EXTRA STRENGTH) 500 mg tablet; Take 2 Tabs by mouth every six (6) hours as needed for Pain. Dispense: 240 Tab; Refill: 2  - predniSONE (DELTASONE) 10 mg tablet; 4 per day x 3 days, then 3 per day x 3 days, then 2 per day x 3 days, then 1 per day x 3 days, then DC  Dispense: 30 Tab; Refill: 0    5. Urinary urgency  - START finasteride (PROSCAR) 5 mg tablet; Take 1 Tab by mouth daily. Dispense: 90 Tab; Refill: 2  - URINALYSIS W/ RFLX MICROSCOPIC; Future    6. Kidney lesion, native, right  - check US KUB    7. Simple chronic bronchitis (HCC)  - budesonide-formoterol (SYMBICORT) 160-4.5 mcg/actuation HFA inhaler; Take 2 Puffs by inhalation two (2) times a day. Dispense: 1 Inhaler; Refill: 5  - albuterol (PROVENTIL HFA, VENTOLIN HFA, PROAIR HFA) 90 mcg/actuation inhaler; Take 2 Puffs by inhalation every six (6) hours as needed for Wheezing or Shortness of Breath. Dispense: 1 Inhaler; Refill: 12  - levoFLOXacin (LEVAQUIN) 500 mg tablet; Take 1 Tab by mouth daily for 5 days. Dispense: 5 Tab; Refill: 0    8. Muscle cramps  - baclofen (LIORESAL) 10 mg tablet; Take 1 Tab by mouth three (3) times daily. Dispense: 270 Tab; Refill: 2    9.  Severe single current episode of major depressive disorder, without psychotic features [facility]  - increase DULoxetine 40 mg cpDR; Take 40 mg by mouth daily. Dispense: 90 Cap; Refill: 2      Follow-up Disposition:  Return in about 1 week (around 6/14/2017) for complete physical.      I have discussed the diagnosis with the patient and the intended plan as seen in the above orders. The patient has received an after-visit summary and questions were answered concerning future plans. I have discussed medication side effects and warnings with the patient as well. I have reviewed the plan of care with the patient, accepted their input and they are in agreement with the treatment goals.        Oc Diaz MD  June 7, 2017

## 2017-06-08 ENCOUNTER — TELEPHONE (OUTPATIENT)
Dept: FAMILY MEDICINE CLINIC | Age: 62
End: 2017-06-08

## 2017-06-08 NOTE — TELEPHONE ENCOUNTER
Pharmacy is asking for the Rx for Proscar to be FAXED to [phone] for patient.  If you have any questions she can be reached at [phone] ext [phone]

## 2017-06-14 ENCOUNTER — HOSPITAL ENCOUNTER (OUTPATIENT)
Dept: ULTRASOUND IMAGING | Age: 62
Discharge: HOME | End: 2017-06-14
Attending: INTERNAL MEDICINE
Payer: COMMERCIAL

## 2017-06-14 ENCOUNTER — OFFICE VISIT (OUTPATIENT)
Dept: FAMILY MEDICINE CLINIC | Age: 62
End: 2017-06-14

## 2017-06-14 ENCOUNTER — HOSPITAL ENCOUNTER (OUTPATIENT)
Dept: LAB | Age: 62
Discharge: HOME | End: 2017-06-14
Payer: OTHER GOVERNMENT

## 2017-06-14 VITALS
TEMPERATURE: 96.3 F | OXYGEN SATURATION: 96 % | HEIGHT: 74 IN | BODY MASS INDEX: 24.77 KG/M2 | DIASTOLIC BLOOD PRESSURE: 83 MMHG | RESPIRATION RATE: 17 BRPM | WEIGHT: 193 LBS | SYSTOLIC BLOOD PRESSURE: 127 MMHG | HEART RATE: 77 BPM

## 2017-06-14 DIAGNOSIS — I10 ESSENTIAL HYPERTENSION WITH GOAL BLOOD PRESSURE LESS THAN 140/90: ICD-10-CM

## 2017-06-14 DIAGNOSIS — R39.15 URINARY URGENCY: ICD-10-CM

## 2017-06-14 DIAGNOSIS — Z00.00 ANNUAL PHYSICAL EXAM: ICD-10-CM

## 2017-06-14 DIAGNOSIS — Z12.11 SCREENING FOR COLON CANCER: ICD-10-CM

## 2017-06-14 DIAGNOSIS — Z11.59 NEED FOR HEPATITIS C SCREENING TEST: ICD-10-CM

## 2017-06-14 DIAGNOSIS — Z00.00 ANNUAL PHYSICAL EXAM: Primary | ICD-10-CM

## 2017-06-14 DIAGNOSIS — N28.9 KIDNEY LESION, NATIVE, RIGHT: ICD-10-CM

## 2017-06-14 PROBLEM — N28.1 COMPLEX RENAL CYST: Status: ACTIVE | Noted: 2017-06-14

## 2017-06-14 LAB
CHOLEST SERPL-MCNC: 167 MG/DL
EST. AVERAGE GLUCOSE BLD GHB EST-MCNC: 131 MG/DL
HBA1C MFR BLD: 6.2 % (ref 4.2–5.6)
HDLC SERPL-MCNC: 42 MG/DL (ref 40–60)
HDLC SERPL: 4 {RATIO} (ref 0–5)
LDLC SERPL CALC-MCNC: 101 MG/DL (ref 0–100)
LIPID PROFILE,FLP: ABNORMAL
TRIGL SERPL-MCNC: 120 MG/DL (ref ?–150)
VLDLC SERPL CALC-MCNC: 24 MG/DL

## 2017-06-14 PROCEDURE — 36415 COLL VENOUS BLD VENIPUNCTURE: CPT | Performed by: INTERNAL MEDICINE

## 2017-06-14 PROCEDURE — 84153 ASSAY OF PSA TOTAL: CPT | Performed by: INTERNAL MEDICINE

## 2017-06-14 PROCEDURE — 86803 HEPATITIS C AB TEST: CPT | Performed by: INTERNAL MEDICINE

## 2017-06-14 PROCEDURE — 80061 LIPID PANEL: CPT | Performed by: INTERNAL MEDICINE

## 2017-06-14 PROCEDURE — 76775 US EXAM ABDO BACK WALL LIM: CPT

## 2017-06-14 PROCEDURE — 83036 HEMOGLOBIN GLYCOSYLATED A1C: CPT | Performed by: INTERNAL MEDICINE

## 2017-06-14 RX ORDER — FINASTERIDE 5 MG/1
5 TABLET, FILM COATED ORAL DAILY
Qty: 90 TAB | Refills: 2 | Status: SHIPPED | OUTPATIENT
Start: 2017-06-14 | End: 2017-11-13 | Stop reason: SDUPTHER

## 2017-06-14 NOTE — PROGRESS NOTES
Patient presents to clinic for complete physical     Advance Directive:    1. Do you have an advance directive in place? Patient Reply:     2. If not, would you like material regarding how to put one in place? Patient Reply:      Coordination of Care:    1. Have you been to the ER, urgent care clinic since your last visit? Hospitalized since your last visit? No    2. Have you seen or consulted any other health care providers outside of the [address] [place] [address] since your last visit? Include any pap smears or colon screening. No    Depression Screening completed. Learning Assessment completed. Abuse Screening completed. Health Maintenance reviewed and discussed per provider.

## 2017-06-14 NOTE — PROGRESS NOTES
ANNUAL PHYSICAL EXAMINATION    History of Present Illness  Dagmar Ying is a 60 y.o. male who presents today for management of    Chief Complaint   Patient presents with    Complete Physical       Patient is cutting down on smoking. He is down to 3 cigarettes per day. He is sexually active with one partner. He smokes marijuana every few months. He lives by himself. Past Medical History  Past Medical History:   Diagnosis Date    Degenerative disc disease at L5-S1 level 12/27/2016    Hypertension     PTSD (post-traumatic stress disorder)     Urinary urgency 6/7/2017        Surgical History  Past Surgical History:   Procedure Laterality Date    HX HERNIA REPAIR          Current Medications  Current Outpatient Prescriptions   Medication Sig    finasteride (PROSCAR) 5 mg tablet Take 1 Tab by mouth daily.  budesonide-formoterol (SYMBICORT) 160-4.5 mcg/actuation HFA inhaler Take 2 Puffs by inhalation two (2) times a day.  albuterol (PROVENTIL HFA, VENTOLIN HFA, PROAIR HFA) 90 mcg/actuation inhaler Take 2 Puffs by inhalation every six (6) hours as needed for Wheezing or Shortness of Breath.  etodolac (LODINE) 400 mg tablet Take 400 mg by mouth two (2) times daily (with meals).  acetaminophen (TYLENOL EXTRA STRENGTH) 500 mg tablet Take 2 Tabs by mouth every six (6) hours as needed for Pain.  predniSONE (DELTASONE) 10 mg tablet 4 per day x 3 days, then 3 per day x 3 days, then 2 per day x 3 days, then 1 per day x 3 days, then DC    baclofen (LIORESAL) 10 mg tablet Take 1 Tab by mouth three (3) times daily.  DULoxetine 40 mg cpDR Take 40 mg by mouth daily.  lisinopril-hydroCHLOROthiazide (PRINZIDE, ZESTORETIC) 20-25 mg per tablet Take 1 Tab by mouth daily.  sildenafil citrate (VIAGRA) 100 mg tablet Take 1 Tab by mouth as needed. No current facility-administered medications for this visit.         Allergies/Drug Reactions  Allergies   Allergen Reactions    Codeine Swelling    Tramadol Other (comments)     Lightheadedness, \"high\"        Family History  Family History   Problem Relation Age of Onset    Hypertension Mother     Hypertension Sister         Social History  Social History     Social History    Marital status: SINGLE     Spouse name: N/A    Number of children: N/A    Years of education: N/A     Occupational History    Not on file. Social History Main Topics    Smoking status: Current Every Day Smoker     Packs/day: 0.50     Years: 15.00    Smokeless tobacco: Never Used    Alcohol use No    Drug use: No    Sexual activity: Yes     Partners: Female     Other Topics Concern    Not on file     Social History Narrative       Health Maintenance   Topic Date Due    Hepatitis C Screening  1955    Pneumococcal 19-64 Medium Risk (1 of 1 - PPSV23) 01/07/1973    DTaP/Tdap/Td series (1 - Tdap) 01/07/1975    FOBT Q 1 YEAR AGE 50-75  01/07/2004    ZOSTER VACCINE AGE 60>  01/07/2014    INFLUENZA AGE 9 TO ADULT  08/01/2017       There is no immunization history on file for this patient.     Review of Systems  General ROS: negative for - chills, fatigue or fever  Psychological ROS: positive for - depression  ENT ROS: negative  Allergy and Immunology ROS: negative  Hematological and Lymphatic ROS: negative  Respiratory ROS: no cough, shortness of breath, or wheezing  Cardiovascular ROS: no chest pain or dyspnea on exertion  Gastrointestinal ROS: no abdominal pain, change in bowel habits, or black or bloody stools  positive for - constipation  Genito-Urinary ROS: positive for - urinary frequency/urgency  Musculoskeletal ROS: positive for - back pain  Neurological ROS: negative  Dermatological ROS: negative     Hearing Screening    125Hz 250Hz 500Hz 1000Hz 2000Hz 3000Hz 4000Hz 6000Hz 8000Hz   Right ear:   Pass Pass Pass  Pass     Left ear:   Pass Pass Pass  Pass        Visual Acuity Screening    Right eye Left eye Both eyes   Without correction:      With correction: 20/30 20/30 20/30       Physical Exam  Vital signs:   Vitals:    06/14/17 0850   BP: 127/83   Pulse: 77   Resp: 17   Temp: 96.3 °F (35.7 °C)   TempSrc: Oral   SpO2: 96%   Weight: 193 lb (87.5 kg)   Height: 6' 2\" (1.88 m)       General: alert, oriented, not in distress  Head: scalp normal, atraumatic  Eyes: pupils are equal and reactive, full and intact EOM's  Ears: patent ear canal, intact tympanic membrane  Nose: normal turbinates, no congestion or discharge  Lips/Mouth: moist lips and buccal mucosa, non-enlarged tonsils, pink throat  Neck: supple, no JVD, no lymphadenopathy, non-palpable thyroid  Chest/Lungs: clear breath sounds, no wheezing or crackles  Heart: normal rate, regular rhythm, no murmur  Abdomen: soft, non-distended, non-tender, normal bowel sounds, no organomegaly, no masses  Extremities: no focal deformities, no edema  Skin: no active skin lesions    Assessment/Plan:      ICD-10-CM ICD-9-CM    1. Annual physical exam Z00.00 V70.0 HEMOGLOBIN A1C WITH EAG      LIPID PANEL   2. Urinary urgency R39.15 788.63 finasteride (PROSCAR) 5 mg tablet      PROSTATE SPECIFIC AG (PSA)   3. Need for hepatitis C screening test Z11.59 V73.89 HCV AB W/RFLX TO JACQUELIN   4. Essential hypertension with goal blood pressure less than 140/90 I10 401.9 HEMOGLOBIN A1C WITH EAG      LIPID PANEL   5. Screening for colon cancer Z12.11 V76.51 OCCULT BLOOD, IMMUNOASSAY (FIT)       Health Maintenance  Colon cancer: ordered FIT stool occult  Dyslipidemia: will check FLP and CMP  Diabetes mellitus: will check FBG  Influenza vaccine: due in the fall  Pneumococcal vaccine: given at the [place] 2 years ago  Tdap: unknown  Herpes Zoster vaccine: due at age 60  Hep B vaccine: not indicated    Weight:  Body mass index is Estimated body mass index is 24.78 kg/(m^2) as calculated from the following:    Height as of this encounter: 6' 2\" (1.88 m). Weight as of this encounter: 193 lb (87.5 kg). .       Follow-up Disposition:  Return in about 2 months (around 8/14/2017) for rov. I have discussed the diagnosis with the patient and the intended plan as seen in the above orders. The patient has received an after-visit summary and questions were answered concerning future plans. I have discussed medication side effects and warnings with the patient as well. I have reviewed the plan of care with the patient, accepted their input and they are in agreement with the treatment goals.        Fawn Gupta MD  June 14, 2017

## 2017-06-14 NOTE — MR AVS SNAPSHOT
Visit Information Date & Time Provider Department Dept. Phone Encounter #  
 6/14/2017  8:45 AM Tawana Garcia, [address] [facility] [phone] [id number] Follow-up Instructions Return in about 2 months (around 8/14/2017) for rov. Upcoming Health Maintenance Date Due Hepatitis C Screening 1955 Pneumococcal 19-64 Medium Risk (1 of 1 - PPSV23) 1/7/1973 DTaP/Tdap/Td series (1 - Tdap) 1/7/1975 FOBT Q 1 YEAR AGE 50-75 1/7/2004 ZOSTER VACCINE AGE 60> 1/7/2014 INFLUENZA AGE 9 TO ADULT 8/1/2017 Allergies as of 6/14/2017  Review Complete On: 6/14/2017 By: Tawana Garcia MD  
  
 Severity Noted Reaction Type Reactions Codeine  06/28/2016    Swelling Tramadol  06/14/2017    Other (comments) Lightheadedness, \"high\" Current Immunizations  Never Reviewed No immunizations on file. Not reviewed this visit You Were Diagnosed With   
  
 Codes Comments Annual physical exam    -  Primary ICD-10-CM: Z00.00 ICD-9-CM: V70.0 Urinary urgency     ICD-10-CM: R39.15 ICD-9-CM: 553.86 Need for hepatitis C screening test     ICD-10-CM: Z11.59 
ICD-9-CM: V73.89 Essential hypertension with goal blood pressure less than 140/90     ICD-10-CM: I10 
ICD-9-CM: 401.9 Screening for colon cancer     ICD-10-CM: Z12.11 ICD-9-CM: V76.51 Vitals BP Pulse Temp Resp Height(growth percentile) Weight(growth percentile) 127/83 (BP 1 Location: Left arm, BP Patient Position: Sitting) 77 96.3 °F (35.7 °C) (Oral) 17 6' 2\" (1.88 m) 193 lb (87.5 kg) SpO2 BMI Smoking Status 96% 24.78 kg/m2 Current Every Day Smoker Vitals History BMI and BSA Data Body Mass Index Body Surface Area 24.78 kg/m 2 2.14 m 2 Your Updated Medication List  
  
   
This list is accurate as of: 6/14/17  9:15 AM.  Always use your most recent med list.  
  
  
  
  
 acetaminophen 500 mg tablet Commonly known as:  [address] Take 2 Tabs by mouth every six (6) hours as needed for Pain. albuterol 90 mcg/actuation inhaler Commonly known as:  PROVENTIL HFA, VENTOLIN HFA, PROAIR HFA Take 2 Puffs by inhalation every six (6) hours as needed for Wheezing or Shortness of Breath. baclofen 10 mg tablet Commonly known as:  LIORESAL Take 1 Tab by mouth three (3) times daily. budesonide-formoterol 160-4.5 mcg/actuation HFA inhaler Commonly known as:  SYMBICORT Take 2 Puffs by inhalation two (2) times a day. DULoxetine 40 mg Cpdr  
Take 40 mg by mouth daily. etodolac 400 mg tablet Commonly known as:  LODINE Take 400 mg by mouth two (2) times daily (with meals). finasteride 5 mg tablet Commonly known as:  PROSCAR Take 1 Tab by mouth daily. lisinopril-hydroCHLOROthiazide 20-25 mg per tablet Commonly known as:  Janette Abbe Take 1 Tab by mouth daily. predniSONE 10 mg tablet Commonly known as:  DELTASONE  
4 per day x 3 days, then 3 per day x 3 days, then 2 per day x 3 days, then 1 per day x 3 days, then DC  
  
 sildenafil citrate 100 mg tablet Commonly known as:  VIAGRA Take 1 Tab by mouth as needed. Prescriptions Printed Refills  
 finasteride (PROSCAR) 5 mg tablet 2 Sig: Take 1 Tab by mouth daily. Class: Print Route: Oral  
  
Follow-up Instructions Return in about 2 months (around 8/14/2017) for rov. To-Do List   
 06/14/2017 Lab:  HCV AB W/RFLX TO JACQUELIN   
  
 06/14/2017 Lab:  HEMOGLOBIN A1C WITH EAG   
  
 06/14/2017 Lab:  LIPID PANEL   
  
 06/14/2017 Lab:  OCCULT BLOOD, IMMUNOASSAY (FIT)   
  
 06/14/2017 Lab:  PROSTATE SPECIFIC AG (PSA)   
  
 06/14/2017 1:45 PM  
  Appointment with [facility] at [facility] ([phone]) OUTSIDE FILMS  - Any outside films related to the study being scheduled should be brought with you on the day of the exam.  If this cannot be done there may be a delay in the reading of the study. MEDICATIONS  - Patient must bring a complete list of all medications currently taking to include prescriptions, over-the-counter meds, herbals, vitamins & any dietary supplements Introducing [facility]! Dear Miesha Roca: Thank you for requesting a [employer] account. Our records indicate that you already have an active [employer] account. You can access your account anytime at [email or web address] Did you know that you can access your hospital and ER discharge instructions at any time in [employer]? You can also review all of your test results from your hospital stay or ER visit. Additional Information If you have questions, please visit the Frequently Asked Questions section of the [employer] website at [email or web address] Remember, [employer] is NOT to be used for urgent needs. For medical emergencies, dial 911. Now available from your iPhone and Android! Please provide this summary of care documentation to your next provider. Your primary care clinician is listed as Dora Cook. If you have any questions after today's visit, please call [phone].

## 2017-06-15 LAB
HCV AB S/CO SERPL IA: <0.1 S/CO RATIO (ref 0–0.9)
HCV AB SERPL QL IA: NORMAL
PSA SERPL-MCNC: 0.6 NG/ML (ref 0–4)

## 2017-08-14 ENCOUNTER — OFFICE VISIT (OUTPATIENT)
Dept: FAMILY MEDICINE CLINIC | Age: 62
End: 2017-08-14

## 2017-08-14 VITALS
WEIGHT: 184 LBS | TEMPERATURE: 98 F | HEIGHT: 74 IN | BODY MASS INDEX: 23.61 KG/M2 | RESPIRATION RATE: 20 BRPM | SYSTOLIC BLOOD PRESSURE: 130 MMHG | DIASTOLIC BLOOD PRESSURE: 80 MMHG | HEART RATE: 92 BPM | OXYGEN SATURATION: 96 %

## 2017-08-14 DIAGNOSIS — R26.9 GAIT DISTURBANCE: ICD-10-CM

## 2017-08-14 DIAGNOSIS — M62.838 MUSCLE SPASMS OF BOTH LOWER EXTREMITIES: ICD-10-CM

## 2017-08-14 DIAGNOSIS — N52.9 ERECTILE DYSFUNCTION, UNSPECIFIED ERECTILE DYSFUNCTION TYPE: ICD-10-CM

## 2017-08-14 DIAGNOSIS — M51.37 DEGENERATIVE DISC DISEASE AT L5-S1 LEVEL: ICD-10-CM

## 2017-08-14 DIAGNOSIS — F32.4 MAJOR DEPRESSIVE DISORDER WITH SINGLE EPISODE, IN PARTIAL REMISSION (HCC): ICD-10-CM

## 2017-08-14 DIAGNOSIS — M54.41 CHRONIC MIDLINE LOW BACK PAIN WITH RIGHT-SIDED SCIATICA: Primary | ICD-10-CM

## 2017-08-14 DIAGNOSIS — I10 ESSENTIAL HYPERTENSION WITH GOAL BLOOD PRESSURE LESS THAN 140/90: ICD-10-CM

## 2017-08-14 DIAGNOSIS — G89.29 CHRONIC MIDLINE LOW BACK PAIN WITH RIGHT-SIDED SCIATICA: Primary | ICD-10-CM

## 2017-08-14 RX ORDER — SILDENAFIL 100 MG/1
100 TABLET, FILM COATED ORAL AS NEEDED
Qty: 20 TAB | Refills: 5 | Status: SHIPPED | OUTPATIENT
Start: 2017-08-14 | End: 2017-11-13 | Stop reason: SDUPTHER

## 2017-08-14 RX ORDER — OXYCODONE AND ACETAMINOPHEN 5; 325 MG/1; MG/1
1 TABLET ORAL
Qty: 15 TAB | Refills: 0 | Status: SHIPPED | OUTPATIENT
Start: 2017-08-14 | End: 2017-11-13

## 2017-08-14 RX ORDER — BACLOFEN 20 MG/1
20 TABLET ORAL 3 TIMES DAILY
Qty: 90 TAB | Refills: 2 | Status: SHIPPED | OUTPATIENT
Start: 2017-08-14 | End: 2017-11-13

## 2017-08-14 RX ORDER — LISINOPRIL AND HYDROCHLOROTHIAZIDE 20; 25 MG/1; MG/1
1 TABLET ORAL DAILY
Qty: 90 TAB | Refills: 2 | Status: SHIPPED | OUTPATIENT
Start: 2017-08-14 | End: 2017-11-13 | Stop reason: SDUPTHER

## 2017-08-14 NOTE — MR AVS SNAPSHOT
Visit Information Date & Time Provider Department Dept. Phone Encounter #  
 8/14/2017  9:30 AM Ammon Leiva, [address] [id number] Follow-up Instructions Return in about 3 months (around 11/14/2017) for rov. Upcoming Health Maintenance Date Due Pneumococcal 19-64 Medium Risk (1 of 1 - PPSV23) 1/7/1973 DTaP/Tdap/Td series (1 - Tdap) 1/7/1975 FOBT Q 1 YEAR AGE 50-75 1/7/2004 ZOSTER VACCINE AGE 60> 11/7/2013 INFLUENZA AGE 9 TO ADULT 8/1/2017 Allergies as of 8/14/2017  Review Complete On: 8/14/2017 By: Ammon Leiva MD  
  
 Severity Noted Reaction Type Reactions Codeine  06/28/2016    Swelling Tramadol  06/14/2017    Other (comments) Lightheadedness, \"high\" Current Immunizations  Never Reviewed No immunizations on file. Not reviewed this visit You Were Diagnosed With   
  
 Codes Comments Chronic midline low back pain with right-sided sciatica    -  Primary ICD-10-CM: M54.41, G89.29 ICD-9-CM: 724.2, 724.3, 338.29 Essential hypertension with goal blood pressure less than 140/90     ICD-10-CM: I10 
ICD-9-CM: 401.9 Erectile dysfunction, unspecified erectile dysfunction type     ICD-10-CM: N52.9 ICD-9-CM: 607.84 Degenerative disc disease at L5-S1 level     ICD-10-CM: M51.36 
ICD-9-CM: 722.52 Muscle spasms of both lower extremities     ICD-10-CM: L37.823 ICD-9-CM: 728.85 Gait disturbance     ICD-10-CM: R26.9 ICD-9-CM: 781.2 Major depressive disorder with single episode, in partial remission [facility]     ICD-10-CM: F32.4 ICD-9-CM: 296.25 Vitals BP Pulse Temp Resp Height(growth percentile) Weight(growth percentile) 130/80 92 98 °F (36.7 °C) (Oral) 20 6' 2\" (1.88 m) 184 lb (83.5 kg) SpO2 BMI Smoking Status 96% 23.62 kg/m2 Current Every Day Smoker Vitals History BMI and BSA Data  Body Mass Index Body Surface Area  
 23.62 kg/m 2 2.09 m 2  
  
 Your Updated Medication List  
  
   
This list is accurate as of: 8/14/17  9:33 AM.  Always use your most recent med list.  
  
  
  
  
 acetaminophen 500 mg tablet Commonly known as:  [address] Take 2 Tabs by mouth every six (6) hours as needed for Pain. albuterol 90 mcg/actuation inhaler Commonly known as:  PROVENTIL HFA, VENTOLIN HFA, PROAIR HFA Take 2 Puffs by inhalation every six (6) hours as needed for Wheezing or Shortness of Breath. baclofen 20 mg tablet Commonly known as:  LIORESAL Take 1 Tab by mouth three (3) times daily. budesonide-formoterol 160-4.5 mcg/actuation HFA inhaler Commonly known as:  SYMBICORT Take 2 Puffs by inhalation two (2) times a day. DULoxetine 40 mg Cpdr  
Take 40 mg by mouth daily. etodolac 400 mg tablet Commonly known as:  LODINE Take 400 mg by mouth two (2) times daily (with meals). finasteride 5 mg tablet Commonly known as:  PROSCAR Take 1 Tab by mouth daily. lisinopril-hydroCHLOROthiazide 20-25 mg per tablet Commonly known as:  [place] Done Take 1 Tab by mouth daily. oxyCODONE-acetaminophen 5-325 mg per tablet Commonly known as:  PERCOCET Take 1 Tab by mouth every eight (8) hours as needed for Pain. Max Daily Amount: 3 Tabs.  
  
 sildenafil citrate 100 mg tablet Commonly known as:  VIAGRA Take 1 Tab by mouth as needed. Prescriptions Printed Refills  
 lisinopril-hydroCHLOROthiazide (PRINZIDE, ZESTORETIC) 20-25 mg per tablet 2 Sig: Take 1 Tab by mouth daily. Class: Print Route: Oral  
 sildenafil citrate (VIAGRA) 100 mg tablet 5 Sig: Take 1 Tab by mouth as needed. Class: Print Route: Oral  
 baclofen (LIORESAL) 20 mg tablet 2 Sig: Take 1 Tab by mouth three (3) times daily. Class: Print  Route: Oral  
 oxyCODONE-acetaminophen (PERCOCET) 5-325 mg per tablet 0  
 Sig: Take 1 Tab by mouth every eight (8) hours as needed for Pain. Max Daily Amount: 3 Tabs. Class: Print Route: Oral  
  
We Performed the Following REFERRAL TO PHYSICAL THERAPY [QCQ54 Custom] Comments:  
 62/M with chronic low back pain and gait disturbance, frequent falls. He has degenerative disc disease at L5-S1. Follow-up Instructions Return in about 3 months (around 11/14/2017) for rov. Referral Information Referral ID Referred By Referred To  
  
 [id number] Guerita Antunez Not Available Visits Status Start Date End Date 1 New Request 8/14/17 8/14/18 If your referral has a status of pending review or denied, additional information will be sent to support the outcome of this decision. Introducing \[facility]\"" & HEALTH SERVICES! Dear Kathrine Paulino: Thank you for requesting a TandemLaunch account. Our records indicate that you already have an active TandemLaunch account. You can access your account anytime at [email or web address] Did you know that you can access your hospital and ER discharge instructions at any time in TandemLaunch? You can also review all of your test results from your hospital stay or ER visit. Additional Information If you have questions, please visit the Frequently Asked Questions section of the TandemLaunch website at [email or web address] Remember, TandemLaunch is NOT to be used for urgent needs. For medical emergencies, dial 911. Now available from your iPhone and Android! Please provide this summary of care documentation to your next provider. Your primary care clinician is listed as Severiano Robinsons. If you have any questions after today's visit, please call [phone].

## 2017-08-14 NOTE — PROGRESS NOTES
History of Present Illness  Sam Mcginnis is a 60 y.o. male who presents today for management of    Chief Complaint   Patient presents with    Medication Refill    Back Pain    Hypertension       Patient reports of constant chronic back pain. He takes etodolac and tylenol extra strength as needed. He reports that tramadol made him feel \"high. \" No associated weakness of numbness of both LE. He is waiting for authorization for pain management and spine surgery referrals. He reports of on and off spasms on both feet and ankles. He takes baclofen 10mg which do not help. He reports of joint stiffness on his hands, knees and toes. He has persistent erectile dysfunction. He generally does not low fat diet. He follows a low salt diet. He had 6 falls in the 3 months. Problem List  Patient Active Problem List    Diagnosis Date Noted    Gait disturbance 08/14/2017    Complex renal cyst 06/14/2017    Urinary urgency 06/07/2017    Degenerative disc disease at L5-S1 level 12/27/2016    PTSD (post-traumatic stress disorder) 11/29/2016    Essential hypertension with goal blood pressure less than 140/90 06/28/2016    Chronic midline low back pain with right-sided sciatica 06/28/2016    Nicotine abuse 06/28/2016       Past Medical History  Past Medical History:   Diagnosis Date    Degenerative disc disease at L5-S1 level 12/27/2016    Hypertension     PTSD (post-traumatic stress disorder)     Urinary urgency 6/7/2017        Surgical History  Past Surgical History:   Procedure Laterality Date    HX HERNIA REPAIR          Current Medications  Current Outpatient Prescriptions   Medication Sig    lisinopril-hydroCHLOROthiazide (PRINZIDE, ZESTORETIC) 20-25 mg per tablet Take 1 Tab by mouth daily.  sildenafil citrate (VIAGRA) 100 mg tablet Take 1 Tab by mouth as needed.  baclofen (LIORESAL) 20 mg tablet Take 1 Tab by mouth three (3) times daily.     oxyCODONE-acetaminophen (PERCOCET) 5-325 mg per tablet Take 1 Tab by mouth every eight (8) hours as needed for Pain. Max Daily Amount: 3 Tabs.  finasteride (PROSCAR) 5 mg tablet Take 1 Tab by mouth daily.  budesonide-formoterol (SYMBICORT) 160-4.5 mcg/actuation HFA inhaler Take 2 Puffs by inhalation two (2) times a day.  albuterol (PROVENTIL HFA, VENTOLIN HFA, PROAIR HFA) 90 mcg/actuation inhaler Take 2 Puffs by inhalation every six (6) hours as needed for Wheezing or Shortness of Breath.  etodolac (LODINE) 400 mg tablet Take 400 mg by mouth two (2) times daily (with meals).  acetaminophen (TYLENOL EXTRA STRENGTH) 500 mg tablet Take 2 Tabs by mouth every six (6) hours as needed for Pain.  DULoxetine 40 mg cpDR Take 40 mg by mouth daily. No current facility-administered medications for this visit. Allergies/Drug Reactions  Allergies   Allergen Reactions    Codeine Swelling    Tramadol Other (comments)     Lightheadedness, \"high\"        Family History  Family History   Problem Relation Age of Onset    Hypertension Mother     Hypertension Sister         Social History  Social History     Social History    Marital status: SINGLE     Spouse name: N/A    Number of children: N/A    Years of education: N/A     Occupational History    Not on file.      Social History Main Topics    Smoking status: Current Every Day Smoker     Packs/day: 0.50     Years: 15.00    Smokeless tobacco: Never Used    Alcohol use No    Drug use: No    Sexual activity: Yes     Partners: Female     Other Topics Concern    Not on file     Social History Narrative       Review of Systems  General ROS: negative for - chills, fatigue or fever  Psychological ROS: positive for - depression  negative for - anxiety, concentration difficulties or mood swings  Respiratory ROS: no cough, shortness of breath, or wheezing  Cardiovascular ROS: no chest pain or dyspnea on exertion  Gastrointestinal ROS: no abdominal pain, change in bowel habits, or black or bloody stools  Genito-Urinary ROS: no dysuria, trouble voiding, or hematuria  positive for - erectile dysfunction  Musculoskeletal ROS: positive for - joint pain, joint stiffness and pain in back - lower  Neurological ROS: negative for - gait disturbance, headaches or numbness/tingling      Physical Exam  Vital signs:   Vitals:    08/14/17 0904 08/14/17 0907   BP: (!) 128/94 130/80   Pulse: 92    Resp: 20    Temp: 98 °F (36.7 °C)    TempSrc: Oral    SpO2: 96%    Weight: 184 lb (83.5 kg)    Height: 6' 2\" (1.88 m)        General: alert, oriented, not in distress  Chest/Lungs: clear breath sounds, no wheezing or crackles  Heart: normal rate, regular rhythm, no murmur  Abdomen: soft, non-distended, non-tender, normal bowel sounds, no organomegaly, no masses  Extremities: no focal deformities, no edema    Assessment/Plan:        ICD-10-CM ICD-9-CM    1. Chronic midline low back pain with right-sided sciatica M54.41 724.2 oxyCODONE-acetaminophen (PERCOCET) 5-325 mg per tablet    G89.29 724.3 REFERRAL TO PHYSICAL THERAPY     338.29    2. Essential hypertension with goal blood pressure less than 140/90 I10 401.9 lisinopril-hydroCHLOROthiazide (PRINZIDE, ZESTORETIC) 20-25 mg per tablet   3. Erectile dysfunction, unspecified erectile dysfunction type N52.9 607.84 sildenafil citrate (VIAGRA) 100 mg tablet   4. Degenerative disc disease at L5-S1 level M51.36 722.52 oxyCODONE-acetaminophen (PERCOCET) 5-325 mg per tablet      REFERRAL TO PHYSICAL THERAPY   5. Muscle spasms of both lower extremities M62.838 728.85 baclofen (LIORESAL) 20 mg tablet   6. Gait disturbance R26.9 781.2 REFERRAL TO PHYSICAL THERAPY   7. Major depressive disorder with single episode, in partial remission (HCC) F32.4 296.25      HTN - controlled, continue current meds  Chronic low back pain - tylenol and etodolac for moderate pain, percocet for severe pain  Gait disturbance - advised to use a cane when going outside, referred to PT.   Depression - stable, cont Cymbalta        Follow-up Disposition:  Return in about 3 months (around 11/14/2017) for rov. I have discussed the diagnosis with the patient and the intended plan as seen in the above orders. The patient has received an after-visit summary and questions were answered concerning future plans. I have discussed medication side effects and warnings with the patient as well. I have reviewed the plan of care with the patient, accepted their input and they are in agreement with the treatment goals.        Mando Wade MD  August 14, 2017

## 2017-08-14 NOTE — PROGRESS NOTES
1. Have you been to the ER, urgent care clinic since your last visit? Hospitalized since your last visit? No    2. Have you seen or consulted any other health care providers outside of the [address] [place] [address] since your last visit? Include any pap smears or colon screening.  No

## 2017-08-21 ENCOUNTER — TELEPHONE (OUTPATIENT)
Dept: FAMILY MEDICINE CLINIC | Age: 62
End: 2017-08-21

## 2017-08-21 NOTE — TELEPHONE ENCOUNTER
[facility] called to say patient not covered by [employer].  2017. Called patient left message about prescriptions.

## 2017-11-13 ENCOUNTER — OFFICE VISIT (OUTPATIENT)
Dept: FAMILY MEDICINE CLINIC | Age: 62
End: 2017-11-13

## 2017-11-13 VITALS
TEMPERATURE: 96.8 F | WEIGHT: 195 LBS | BODY MASS INDEX: 25.03 KG/M2 | SYSTOLIC BLOOD PRESSURE: 143 MMHG | RESPIRATION RATE: 20 BRPM | HEIGHT: 74 IN | HEART RATE: 95 BPM | DIASTOLIC BLOOD PRESSURE: 90 MMHG | OXYGEN SATURATION: 97 %

## 2017-11-13 DIAGNOSIS — J41.0 SIMPLE CHRONIC BRONCHITIS (HCC): ICD-10-CM

## 2017-11-13 DIAGNOSIS — R60.0 LEG EDEMA: ICD-10-CM

## 2017-11-13 DIAGNOSIS — M54.41 CHRONIC MIDLINE LOW BACK PAIN WITH RIGHT-SIDED SCIATICA: ICD-10-CM

## 2017-11-13 DIAGNOSIS — M54.16 LUMBAR RADICULOPATHY: Primary | ICD-10-CM

## 2017-11-13 DIAGNOSIS — R39.15 URINARY URGENCY: ICD-10-CM

## 2017-11-13 DIAGNOSIS — G89.29 CHRONIC MIDLINE LOW BACK PAIN WITH RIGHT-SIDED SCIATICA: ICD-10-CM

## 2017-11-13 DIAGNOSIS — N52.9 ERECTILE DYSFUNCTION, UNSPECIFIED ERECTILE DYSFUNCTION TYPE: ICD-10-CM

## 2017-11-13 DIAGNOSIS — I10 ESSENTIAL HYPERTENSION WITH GOAL BLOOD PRESSURE LESS THAN 140/90: ICD-10-CM

## 2017-11-13 DIAGNOSIS — F32.2 SEVERE SINGLE CURRENT EPISODE OF MAJOR DEPRESSIVE DISORDER, WITHOUT PSYCHOTIC FEATURES (HCC): ICD-10-CM

## 2017-11-13 RX ORDER — SILDENAFIL 100 MG/1
100 TABLET, FILM COATED ORAL AS NEEDED
Qty: 20 TAB | Refills: 5 | Status: SHIPPED | OUTPATIENT
Start: 2017-11-13

## 2017-11-13 RX ORDER — FUROSEMIDE 40 MG/1
40 TABLET ORAL DAILY
Qty: 5 TAB | Refills: 0 | Status: SHIPPED | OUTPATIENT
Start: 2017-11-13 | End: 2017-11-18

## 2017-11-13 RX ORDER — BUDESONIDE AND FORMOTEROL FUMARATE DIHYDRATE 160; 4.5 UG/1; UG/1
2 AEROSOL RESPIRATORY (INHALATION) 2 TIMES DAILY
Qty: 1 INHALER | Refills: 5 | Status: SHIPPED | OUTPATIENT
Start: 2017-11-13

## 2017-11-13 RX ORDER — ALBUTEROL SULFATE 90 UG/1
2 AEROSOL, METERED RESPIRATORY (INHALATION)
Qty: 1 INHALER | Refills: 12 | Status: SHIPPED | OUTPATIENT
Start: 2017-11-13

## 2017-11-13 RX ORDER — POTASSIUM CHLORIDE 20 MEQ/1
20 TABLET, EXTENDED RELEASE ORAL DAILY
Qty: 5 TAB | Refills: 0 | Status: SHIPPED | OUTPATIENT
Start: 2017-11-13

## 2017-11-13 RX ORDER — FINASTERIDE 5 MG/1
5 TABLET, FILM COATED ORAL DAILY
Qty: 90 TAB | Refills: 2 | Status: SHIPPED | OUTPATIENT
Start: 2017-11-13

## 2017-11-13 RX ORDER — LISINOPRIL AND HYDROCHLOROTHIAZIDE 20; 25 MG/1; MG/1
1 TABLET ORAL DAILY
Qty: 90 TAB | Refills: 2 | Status: SHIPPED | OUTPATIENT
Start: 2017-11-13

## 2017-11-13 RX ORDER — DULOXETINE 40 MG/1
40 CAPSULE, DELAYED RELEASE ORAL DAILY
Qty: 90 CAP | Refills: 2 | Status: SHIPPED | OUTPATIENT
Start: 2017-11-13

## 2017-11-13 RX ORDER — GABAPENTIN 300 MG/1
600 CAPSULE ORAL 3 TIMES DAILY
Qty: 360 CAP | Refills: 2 | Status: SHIPPED | OUTPATIENT
Start: 2017-11-13

## 2017-11-13 RX ORDER — ETODOLAC 400 MG/1
400 TABLET, FILM COATED ORAL 2 TIMES DAILY WITH MEALS
Qty: 100 TAB | Refills: 5 | Status: SHIPPED | OUTPATIENT
Start: 2017-11-13

## 2017-11-13 NOTE — MR AVS SNAPSHOT
Visit Information Date & Time Provider Department Dept. Phone Encounter #  
 11/13/2017 10:00 AM Daisy Nina, [address] [id number] Follow-up Instructions Return in about 4 weeks (around 12/11/2017) for rov. Upcoming Health Maintenance Date Due Pneumococcal 19-64 Medium Risk (1 of 1 - PPSV23) 1/7/1973 DTaP/Tdap/Td series (1 - Tdap) 1/7/1975 FOBT Q 1 YEAR AGE 50-75 1/7/2004 ZOSTER VACCINE AGE 60> 11/7/2013 Influenza Age 5 to Adult 8/1/2017 Allergies as of 11/13/2017  Review Complete On: 11/13/2017 By: Daisy Nina MD  
  
 Severity Noted Reaction Type Reactions Codeine  06/28/2016    Swelling Tramadol  06/14/2017    Other (comments) Lightheadedness, \"high\" Current Immunizations  Never Reviewed No immunizations on file. Not reviewed this visit You Were Diagnosed With   
  
 Codes Comments Lumbar radiculopathy    -  Primary ICD-10-CM: M54.16 
ICD-9-CM: 724.4 Essential hypertension with goal blood pressure less than 140/90     ICD-10-CM: I10 
ICD-9-CM: 401.9 Chronic midline low back pain with right-sided sciatica     ICD-10-CM: M54.41, G89.29 ICD-9-CM: 724.2, 724.3, 338.29 Erectile dysfunction, unspecified erectile dysfunction type     ICD-10-CM: N52.9 ICD-9-CM: 607.84 Severe single current episode of major depressive disorder, without psychotic features ([facility]     ICD-10-CM: F32.2 ICD-9-CM: 296.23 Simple chronic bronchitis (HCC)     ICD-10-CM: J41.0 ICD-9-CM: 491.0 Urinary urgency     ICD-10-CM: R39.15 ICD-9-CM: 425.17 Leg edema     ICD-10-CM: R60.0 ICD-9-CM: 933. 3 Vitals BP Pulse Temp Resp Height(growth percentile) Weight(growth percentile) 143/90 95 96.8 °F (36 °C) (Oral) 20 6' 2\" (1.88 m) 195 lb (88.5 kg) SpO2 BMI Smoking Status 97% 25.04 kg/m2 Current Every Day Smoker Vitals History BMI and BSA Data Body Mass Index Body Surface Area 25.04 kg/m 2 2.15 m 2 Your Updated Medication List  
  
   
This list is accurate as of: 11/13/17 10:09 AM.  Always use your most recent med list.  
  
  
  
  
 acetaminophen 500 mg tablet Commonly known as:  [address] Take 2 Tabs by mouth every six (6) hours as needed for Pain. albuterol 90 mcg/actuation inhaler Commonly known as:  PROVENTIL HFA, VENTOLIN HFA, PROAIR HFA Take 2 Puffs by inhalation every six (6) hours as needed for Wheezing or Shortness of Breath. budesonide-formoterol 160-4.5 mcg/actuation Hfaa Commonly known as:  SYMBICORT Take 2 Puffs by inhalation two (2) times a day. DULoxetine 40 mg Cpdr  
Take 40 mg by mouth daily. etodolac 400 mg tablet Commonly known as:  LODINE Take 400 mg by mouth two (2) times daily (with meals). finasteride 5 mg tablet Commonly known as:  PROSCAR Take 1 Tab by mouth daily. furosemide 40 mg tablet Commonly known as:  LASIX Take 1 Tab by mouth daily for 5 days. gabapentin 300 mg capsule Commonly known as:  NEURONTIN Take 2 Caps by mouth three (3) times daily. Indications: NEUROPATHIC PAIN  
  
 lisinopril-hydroCHLOROthiazide 20-25 mg per tablet Commonly known as:  [place] Take 1 Tab by mouth daily. potassium chloride 20 mEq tablet Commonly known as:  K-DUR KLOR-CON Take 1 Tab by mouth daily. sildenafil citrate 100 mg tablet Commonly known as:  VIAGRA Take 1 Tab by mouth as needed. Prescriptions Printed Refills  
 lisinopril-hydroCHLOROthiazide (PRINZIDE, ZESTORETIC) 20-25 mg per tablet 2 Sig: Take 1 Tab by mouth daily. Class: Print Route: Oral  
 gabapentin (NEURONTIN) 300 mg capsule 2 Sig: Take 2 Caps by mouth three (3) times daily. Indications: NEUROPATHIC PAIN Class: Print  Route: Oral  
 etodolac (LODINE) 400 mg tablet 5  
 Sig: Take 400 mg by mouth two (2) times daily (with meals). Class: Print Route: Oral  
 sildenafil citrate (VIAGRA) 100 mg tablet 5 Sig: Take 1 Tab by mouth as needed. Class: Print Route: Oral  
 albuterol (PROVENTIL HFA, VENTOLIN HFA, PROAIR HFA) 90 mcg/actuation inhaler 12 Sig: Take 2 Puffs by inhalation every six (6) hours as needed for Wheezing or Shortness of Breath. Class: Print Route: Inhalation  
 budesonide-formoterol (SYMBICORT) 160-4.5 mcg/actuation HFAA 5 Sig: Take 2 Puffs by inhalation two (2) times a day. Class: Print Route: Inhalation  
 finasteride (PROSCAR) 5 mg tablet 2 Sig: Take 1 Tab by mouth daily. Class: Print Route: Oral  
 DULoxetine 40 mg cpDR 2 Sig: Take 40 mg by mouth daily. Class: Print Route: Oral  
 furosemide (LASIX) 40 mg tablet 0 Sig: Take 1 Tab by mouth daily for 5 days. Class: Print Route: Oral  
 potassium chloride (K-DUR, KLOR-CON) 20 mEq tablet 0 Sig: Take 1 Tab by mouth daily. Class: Print Route: Oral  
  
Follow-up Instructions Return in about 4 weeks (around 12/11/2017) for rov. [facility] & HEALTH SERVICES! Dear Hannah Hamilton: Thank you for requesting a Zeel account. Our records indicate that you already have an active Zeel account. You can access your account anytime at [email or web address] Did you know that you can access your hospital and ER discharge instructions at any time in Zeel? You can also review all of your test results from your hospital stay or ER visit. Additional Information If you have questions, please visit the Frequently Asked Questions section of the Zeel website at [email or web address] Remember, Zeel is NOT to be used for urgent needs. For medical emergencies, dial 911. Now available from your iPhone and Android! Please provide this summary of care documentation to your next provider. Your primary care clinician is listed as Sumanth Quarles. If you have any questions after today's visit, please call [phone].

## 2017-11-13 NOTE — PROGRESS NOTES
History of Present Illness  Julieth Law is a 60 y.o. male who presents today for management of    Chief Complaint   Patient presents with    Hypertension    Back Pain       Patient ran out of his blood pressure medication 3 weeks ago. He does not check his blood pressure at home. He complains of on and off headache and lightheadedness. He denies any chest pain, dyspnea on exertion and leg edema. He has chronic back pain which radiates to the right lower extremity. Pain is constant, 10/10 intensity. He has numbness in the right lower posterior leg. He continues to have on and off spasms on both legs. [facility]. He had 2 falls in the last month. Pain management and cardiology referrals were denied by the [place]. Waiting for physical therapy and spine management referrals. He has stopped taking Cymbalta. He reports of feeling down and depressed. He denies any hallucinations or suicidal ideation. He denies increased cough, increased sputum production. He reports of dyspnea on exertion. He gets short of breath after walking 50 feet. He has on and off chest pain on exertion.     Problem List  Patient Active Problem List    Diagnosis Date Noted    Gait disturbance 08/14/2017    Complex renal cyst 06/14/2017    Urinary urgency 06/07/2017    Degenerative disc disease at L5-S1 level 12/27/2016    PTSD (post-traumatic stress disorder) 11/29/2016    Essential hypertension with goal blood pressure less than 140/90 06/28/2016    Chronic midline low back pain with right-sided sciatica 06/28/2016    Nicotine abuse 06/28/2016       Past Medical History  Past Medical History:   Diagnosis Date    Degenerative disc disease at L5-S1 level 12/27/2016    Hypertension     PTSD (post-traumatic stress disorder)     Urinary urgency 6/7/2017        Surgical History  Past Surgical History:   Procedure Laterality Date    HX HERNIA REPAIR          Current Medications  Current Outpatient Prescriptions   Medication Sig    lisinopril-hydroCHLOROthiazide (PRINZIDE, ZESTORETIC) 20-25 mg per tablet Take 1 Tab by mouth daily.  gabapentin (NEURONTIN) 300 mg capsule Take 2 Caps by mouth three (3) times daily. Indications: NEUROPATHIC PAIN    etodolac (LODINE) 400 mg tablet Take 400 mg by mouth two (2) times daily (with meals).  sildenafil citrate (VIAGRA) 100 mg tablet Take 1 Tab by mouth as needed.  albuterol (PROVENTIL HFA, VENTOLIN HFA, PROAIR HFA) 90 mcg/actuation inhaler Take 2 Puffs by inhalation every six (6) hours as needed for Wheezing or Shortness of Breath.  budesonide-formoterol (SYMBICORT) 160-4.5 mcg/actuation HFAA Take 2 Puffs by inhalation two (2) times a day.  finasteride (PROSCAR) 5 mg tablet Take 1 Tab by mouth daily.  DULoxetine 40 mg cpDR Take 40 mg by mouth daily.  furosemide (LASIX) 40 mg tablet Take 1 Tab by mouth daily for 5 days.  potassium chloride (K-DUR, KLOR-CON) 20 mEq tablet Take 1 Tab by mouth daily.  acetaminophen (TYLENOL EXTRA STRENGTH) 500 mg tablet Take 2 Tabs by mouth every six (6) hours as needed for Pain. No current facility-administered medications for this visit. Allergies/Drug Reactions  Allergies   Allergen Reactions    Codeine Swelling    Tramadol Other (comments)     Lightheadedness, \"high\"        Family History  Family History   Problem Relation Age of Onset    Hypertension Mother     Hypertension Sister         Social History  Social History     Social History    Marital status: SINGLE     Spouse name: N/A    Number of children: N/A    Years of education: N/A     Occupational History    Not on file.      Social History Main Topics    Smoking status: Current Every Day Smoker     Packs/day: 0.50     Years: 15.00    Smokeless tobacco: Never Used    Alcohol use No    Drug use: No    Sexual activity: Yes     Partners: Female     Other Topics Concern    Not on file     Social History Narrative       Review of Systems  General ROS: negative for - chills or fever  Psychological ROS: positive for - depression  Respiratory ROS: positive for - cough, shortness of breath, sputum changes and wheezing  negative for - hemoptysis or orthopnea  Cardiovascular ROS: positive for - chest pain, dyspnea on exertion, edema and palpitations  negative for - loss of consciousness or murmur  Gastrointestinal ROS: no abdominal pain, change in bowel habits, or black or bloody stools  Genito-Urinary ROS: positive for urinary urgency, no dysuria, trouble voiding, or hematuria  Musculoskeletal ROS: positive for - pain in back - lower  Neurological ROS: positive for - dizziness and headaches      Physical Exam  Vital signs:   Vitals:    11/13/17 0942 11/13/17 0944   BP: (!) 146/98 143/90   Pulse: 95    Resp: 20    Temp: 96.8 °F (36 °C)    TempSrc: Oral    SpO2: 97%    Weight: 195 lb (88.5 kg)    Height: 6' 2\" (1.88 m)        General: alert, oriented, not in distress  Chest/Lungs: clear breath sounds, no wheezing or crackles  Heart: normal rate, regular rhythm, no murmur  Abdomen: soft, non-distended, non-tender, normal bowel sounds, no organomegaly, no masses  Extremities: no focal deformities, mild pitting bipedal edema      Assessment/Plan:    1. Essential hypertension with goal blood pressure less than 140/90  - poorly controlled due to poor compliance with medication  - restart lisinopril-hydroCHLOROthiazide (PRINZIDE, ZESTORETIC) 20-25 mg per tablet; Take 1 Tab by mouth daily. Dispense: 90 Tab; Refill: 2  - low salt diet    2. Chronic midline low back pain with right-sided sciatica  - pending physical therapy and spine management referral  - refilled etodolac (LODINE) 400 mg tablet; Take 400 mg by mouth two (2) times daily (with meals). Dispense: 100 Tab; Refill: 5    3. Erectile dysfunction, unspecified erectile dysfunction type  - sildenafil citrate (VIAGRA) 100 mg tablet; Take 1 Tab by mouth as needed. Dispense: 20 Tab; Refill: 5    4. Moderate single current episode of major depressive disorder, without psychotic features ([facility] [address]  - refilled DULoxetine 40 mg cpDR; Take 40 mg by mouth daily. Dispense: 90 Cap; Refill: 2    5. Simple chronic bronchitis (HCC)  - stable  - albuterol (PROVENTIL HFA, VENTOLIN HFA, PROAIR HFA) 90 mcg/actuation inhaler; Take 2 Puffs by inhalation every six (6) hours as needed for Wheezing or Shortness of Breath. Dispense: 1 Inhaler; Refill: 12  - budesonide-formoterol (SYMBICORT) 160-4.5 mcg/actuation HFAA; Take 2 Puffs by inhalation two (2) times a day. Dispense: 1 Inhaler; Refill: 5    6. Urinary urgency  - finasteride (PROSCAR) 5 mg tablet; Take 1 Tab by mouth daily. Dispense: 90 Tab; Refill: 2    7. Lumbar radiculopathy  - start gabapentin (NEURONTIN) 300 mg capsule; Take 2 Caps by mouth three (3) times daily. Indications: NEUROPATHIC PAIN  Dispense: 360 Cap; Refill: 2  - etodolac (LODINE) 400 mg tablet; Take 400 mg by mouth two (2) times daily (with meals). Dispense: 100 Tab; Refill: 5    8. Leg edema  - will give Lasix 40mg x 5 days      Follow-up Disposition:  Return in about 4 weeks (around 12/11/2017) for rov. I have discussed the diagnosis with the patient and the intended plan as seen in the above orders. The patient has received an after-visit summary and questions were answered concerning future plans. I have discussed medication side effects and warnings with the patient as well. I have reviewed the plan of care with the patient, accepted their input and they are in agreement with the treatment goals.        Victor Manuel Monk MD  November 13, 2017

## 2017-11-13 NOTE — PROGRESS NOTES
1. Have you been to the ER, urgent care clinic since your last visit? Hospitalized since your last visit? No    2. Have you seen or consulted any other health care providers outside of the [address] [place] [address] since your last visit? Include any pap smears or colon screening.  No

## 2017-12-20 ENCOUNTER — DOCUMENTATION ONLY (OUTPATIENT)
Dept: FAMILY MEDICINE CLINIC | Age: 62
End: 2017-12-20

## 2017-12-20 NOTE — LETTER
12/20/2017 Junevesna Valerio  
[address] [address] [address] Dear Luis Alberto Alize Palacios, We had an appointment reserved for you 12/11/2017 and 12/12/2017 and were concerned when you did not show or call within 24 hours to cancel the appointment. As indicated in a previous letter, we will no longer be able to schedule appointments for you in advance. If you need to make an appointment, please call the office and we will attempt to schedule, but cannot guarantee, a work-in appointment. In order to provide optimal care to you, we expect you to make it to your appointments. Regrettably, if you miss a work-in appointment, we may have to discharge you from the practice. Sincerely, The scheduling staff [address] [place] [id number] [phone]

## 2017-12-20 NOTE — PROGRESS NOTES
Pt had second & third NO SHOWS 12/11 &  12/12/17. Letter #3 sent 12/20/17. SAME DAY SCHEDULING ONLY. BIANCA/ms.

## 2018-08-30 ENCOUNTER — HOSPITAL ENCOUNTER (OUTPATIENT)
Dept: LAB | Age: 63
Discharge: HOME | End: 2018-08-30
Payer: OTHER GOVERNMENT

## 2018-08-30 ENCOUNTER — OFFICE VISIT (OUTPATIENT)
Dept: FAMILY MEDICINE CLINIC | Age: 63
End: 2018-08-30

## 2018-08-30 VITALS
OXYGEN SATURATION: 98 % | HEART RATE: 99 BPM | HEIGHT: 74 IN | WEIGHT: 164.2 LBS | SYSTOLIC BLOOD PRESSURE: 96 MMHG | DIASTOLIC BLOOD PRESSURE: 63 MMHG | BODY MASS INDEX: 21.07 KG/M2 | TEMPERATURE: 96.7 F | RESPIRATION RATE: 20 BRPM

## 2018-08-30 DIAGNOSIS — R39.15 URINARY URGENCY: ICD-10-CM

## 2018-08-30 DIAGNOSIS — I10 ESSENTIAL HYPERTENSION WITH GOAL BLOOD PRESSURE LESS THAN 140/90: ICD-10-CM

## 2018-08-30 DIAGNOSIS — R63.4 UNINTENDED WEIGHT LOSS: ICD-10-CM

## 2018-08-30 DIAGNOSIS — G89.29 CHRONIC MIDLINE LOW BACK PAIN WITH RIGHT-SIDED SCIATICA: ICD-10-CM

## 2018-08-30 DIAGNOSIS — K40.90 NON-RECURRENT UNILATERAL INGUINAL HERNIA WITHOUT OBSTRUCTION OR GANGRENE: Primary | ICD-10-CM

## 2018-08-30 DIAGNOSIS — R29.6 RECURRENT FALLS: ICD-10-CM

## 2018-08-30 DIAGNOSIS — M54.41 CHRONIC MIDLINE LOW BACK PAIN WITH RIGHT-SIDED SCIATICA: ICD-10-CM

## 2018-08-30 DIAGNOSIS — R26.9 GAIT DISTURBANCE: ICD-10-CM

## 2018-08-30 LAB
ALBUMIN SERPL-MCNC: 3.7 G/DL (ref 3.4–5)
ALBUMIN/GLOB SERPL: 0.9 {RATIO} (ref 0.8–1.7)
ALP SERPL-CCNC: 81 U/L (ref 45–117)
ALT SERPL-CCNC: 20 U/L (ref 16–61)
ANION GAP SERPL CALC-SCNC: 5 MMOL/L (ref 3–18)
APPEARANCE UR: CLEAR
AST SERPL-CCNC: 14 U/L (ref 15–37)
BASOPHILS # BLD: 0 K/UL (ref 0–0.1)
BASOPHILS NFR BLD: 1 % (ref 0–2)
BILIRUB SERPL-MCNC: 0.3 MG/DL (ref 0.2–1)
BILIRUB UR QL: NEGATIVE
BUN SERPL-MCNC: 22 MG/DL (ref 7–18)
BUN/CREAT SERPL: 18 (ref 12–20)
CALCIUM SERPL-MCNC: 9.7 MG/DL (ref 8.5–10.1)
CHLORIDE SERPL-SCNC: 103 MMOL/L (ref 100–108)
CHOLEST SERPL-MCNC: 158 MG/DL
CO2 SERPL-SCNC: 31 MMOL/L (ref 21–32)
COLOR UR: YELLOW
CREAT SERPL-MCNC: 1.22 MG/DL (ref 0.6–1.3)
DIFFERENTIAL METHOD BLD: ABNORMAL
EOSINOPHIL # BLD: 0.1 K/UL (ref 0–0.4)
EOSINOPHIL NFR BLD: 2 % (ref 0–5)
ERYTHROCYTE [DISTWIDTH] IN BLOOD BY AUTOMATED COUNT: 14.1 % (ref 11.6–14.5)
EST. AVERAGE GLUCOSE BLD GHB EST-MCNC: 123 MG/DL
FOLATE SERPL-MCNC: 13 NG/ML (ref 3.1–17.5)
GLOBULIN SER CALC-MCNC: 3.9 G/DL (ref 2–4)
GLUCOSE SERPL-MCNC: 97 MG/DL (ref 74–99)
GLUCOSE UR STRIP.AUTO-MCNC: NEGATIVE MG/DL
HBA1C MFR BLD: 5.9 % (ref 4.2–5.6)
HCT VFR BLD AUTO: 43.6 % (ref 36–48)
HDLC SERPL-MCNC: 47 MG/DL (ref 40–60)
HDLC SERPL: 3.4 {RATIO} (ref 0–5)
HGB BLD-MCNC: 14.2 G/DL (ref 13–16)
HGB UR QL STRIP: NEGATIVE
KETONES UR QL STRIP.AUTO: NEGATIVE MG/DL
LDLC SERPL CALC-MCNC: 98.4 MG/DL (ref 0–100)
LEUKOCYTE ESTERASE UR QL STRIP.AUTO: NEGATIVE
LIPID PROFILE,FLP: NORMAL
LYMPHOCYTES # BLD: 2.3 K/UL (ref 0.9–3.6)
LYMPHOCYTES NFR BLD: 41 % (ref 21–52)
MCH RBC QN AUTO: 30.9 PG (ref 24–34)
MCHC RBC AUTO-ENTMCNC: 32.6 G/DL (ref 31–37)
MCV RBC AUTO: 95 FL (ref 74–97)
MONOCYTES # BLD: 0.5 K/UL (ref 0.05–1.2)
MONOCYTES NFR BLD: 8 % (ref 3–10)
NEUTS SEG # BLD: 2.8 K/UL (ref 1.8–8)
NEUTS SEG NFR BLD: 48 % (ref 40–73)
NITRITE UR QL STRIP.AUTO: NEGATIVE
PH UR STRIP: 5.5 [PH] (ref 5–8)
PLATELET # BLD AUTO: 310 K/UL (ref 135–420)
PMV BLD AUTO: 8.7 FL (ref 9.2–11.8)
POTASSIUM SERPL-SCNC: 4 MMOL/L (ref 3.5–5.5)
PROT SERPL-MCNC: 7.6 G/DL (ref 6.4–8.2)
PROT UR STRIP-MCNC: NEGATIVE MG/DL
PSA SERPL-MCNC: 0.5 NG/ML (ref 0–4)
RBC # BLD AUTO: 4.59 M/UL (ref 4.7–5.5)
SODIUM SERPL-SCNC: 139 MMOL/L (ref 136–145)
SP GR UR REFRACTOMETRY: 1.01 (ref 1–1.03)
TRIGL SERPL-MCNC: 63 MG/DL (ref ?–150)
TSH SERPL DL<=0.05 MIU/L-ACNC: 1.5 UIU/ML (ref 0.36–3.74)
UROBILINOGEN UR QL STRIP.AUTO: 0.2 EU/DL (ref 0.2–1)
VIT B12 SERPL-MCNC: 328 PG/ML (ref 211–911)
VLDLC SERPL CALC-MCNC: 12.6 MG/DL
WBC # BLD AUTO: 5.7 K/UL (ref 4.6–13.2)

## 2018-08-30 PROCEDURE — 82306 VITAMIN D 25 HYDROXY: CPT | Performed by: INTERNAL MEDICINE

## 2018-08-30 PROCEDURE — 85025 COMPLETE CBC W/AUTO DIFF WBC: CPT | Performed by: INTERNAL MEDICINE

## 2018-08-30 PROCEDURE — 36415 COLL VENOUS BLD VENIPUNCTURE: CPT | Performed by: INTERNAL MEDICINE

## 2018-08-30 PROCEDURE — 84443 ASSAY THYROID STIM HORMONE: CPT | Performed by: INTERNAL MEDICINE

## 2018-08-30 PROCEDURE — 80061 LIPID PANEL: CPT | Performed by: INTERNAL MEDICINE

## 2018-08-30 PROCEDURE — 83036 HEMOGLOBIN GLYCOSYLATED A1C: CPT | Performed by: INTERNAL MEDICINE

## 2018-08-30 PROCEDURE — 82746 ASSAY OF FOLIC ACID SERUM: CPT | Performed by: INTERNAL MEDICINE

## 2018-08-30 PROCEDURE — 80053 COMPREHEN METABOLIC PANEL: CPT | Performed by: INTERNAL MEDICINE

## 2018-08-30 PROCEDURE — 87389 HIV-1 AG W/HIV-1&-2 AB AG IA: CPT | Performed by: INTERNAL MEDICINE

## 2018-08-30 PROCEDURE — 84153 ASSAY OF PSA TOTAL: CPT | Performed by: INTERNAL MEDICINE

## 2018-08-30 PROCEDURE — 81003 URINALYSIS AUTO W/O SCOPE: CPT | Performed by: INTERNAL MEDICINE

## 2018-08-30 NOTE — PROGRESS NOTES
History of Present Illness  Mayra Duarte is a 58 y.o. male who presents today for management of    Chief Complaint   Patient presents with    COPD    Abdominal Pain       Groin Mass  Patient presents today for evaluation of left inguinal hernia. Symptoms were first noted 2 months ago. Symptoms did not start at work. Pain is sharp, intermittent. Lump is reducible. Contributing symptoms - Patient does not have chronic constipation, chronic cough, difficulty urinating. Patient does a history of previous hernia surgery. Gait disturbance  He complains of losing balance. He reports that he leans on his right side when walking. He has had 7-8 falls in the last one year. It is associated with weakness on both thighs and tingling on the right buttocks going down to the right leg. He has chronic bilateral low back pain. COPD Review:  The patient is being seen for follow up of COPD. Oxygen: He currently is not on home oxygen therapy. Symptoms: chronic dyspnea: severity = moderate: course of sx: waxing and waning  cough: severity: mild: sputum : none. Patient uses 2 pillows at night. Patient does smoke cigarettes. Cardiovascular Review:  The patient has hypertension. Diet and Lifestyle: generally follows a low fat low cholesterol diet, generally follows a low sodium diet, sedentary, smoker 4 cigarettes per day  Home BP Monitoring: is not measured at home. Pertinent ROS: taking medications as instructed, no medication side effects noted, no TIA's, no chest pain on exertion, notes stable dyspnea on exertion, no change, no swelling of ankles.        Past Medical History  Past Medical History:   Diagnosis Date    COPD (chronic obstructive pulmonary disease) ([facility] [address]     Degenerative disc disease at L5-S1 level 12/27/2016    Hypertension     PTSD (post-traumatic stress disorder)     Urinary urgency 6/7/2017        Surgical History  Past Surgical History:   Procedure Laterality Date    HX HERNIA REPAIR Right 2014        Current Medications  Current Outpatient Prescriptions   Medication Sig    Walker misc Use daily as needed    lisinopril-hydroCHLOROthiazide (PRINZIDE, ZESTORETIC) 20-25 mg per tablet Take 1 Tab by mouth daily. (Patient taking differently: Take 0.5 Tabs by mouth daily.)    gabapentin (NEURONTIN) 300 mg capsule Take 2 Caps by mouth three (3) times daily. Indications: NEUROPATHIC PAIN    etodolac (LODINE) 400 mg tablet Take 400 mg by mouth two (2) times daily (with meals).  sildenafil citrate (VIAGRA) 100 mg tablet Take 1 Tab by mouth as needed.  albuterol (PROVENTIL HFA, VENTOLIN HFA, PROAIR HFA) 90 mcg/actuation inhaler Take 2 Puffs by inhalation every six (6) hours as needed for Wheezing or Shortness of Breath.  budesonide-formoterol (SYMBICORT) 160-4.5 mcg/actuation HFAA Take 2 Puffs by inhalation two (2) times a day.  finasteride (PROSCAR) 5 mg tablet Take 1 Tab by mouth daily.  DULoxetine 40 mg cpDR Take 40 mg by mouth daily.  potassium chloride (K-DUR, KLOR-CON) 20 mEq tablet Take 1 Tab by mouth daily.  acetaminophen (TYLENOL EXTRA STRENGTH) 500 mg tablet Take 2 Tabs by mouth every six (6) hours as needed for Pain. No current facility-administered medications for this visit. Allergies/Drug Reactions  Allergies   Allergen Reactions    Codeine Swelling    Tramadol Other (comments)     Lightheadedness, \"high\"        Family History  Family History   Problem Relation Age of Onset    Hypertension Mother     Hypertension Sister         Social History  Social History     Social History    Marital status: SINGLE     Spouse name: N/A    Number of children: N/A    Years of education: N/A     Occupational History    Not on file.      Social History Main Topics    Smoking status: Current Every Day Smoker     Packs/day: 0.50     Years: 15.00    Smokeless tobacco: Never Used    Alcohol use No    Drug use: No    Sexual activity: Yes     Partners: Female     Other Topics Concern    Not on file     Social History Narrative       Health Maintenance   Topic Date Due    Pneumococcal 19-64 Medium Risk (1 of 1 - PPSV23) 01/07/1973    DTaP/Tdap/Td series (1 - Tdap) 01/07/1975    FOBT Q 1 YEAR AGE 50-75  01/07/2004    ZOSTER VACCINE AGE 60>  11/07/2013    Influenza Age 9 to Adult  08/01/2018    Hepatitis C Screening  Completed       There is no immunization history on file for this patient.     Review of Systems  General ROS: positive for weight loss, negative for - chills, fatigue, fever or sleep disturbance  Psychological ROS: negative for - anxiety or depression  Ophthalmic ROS: negative  ENT ROS: negative  Endocrine ROS: negative  Respiratory ROS: positive for - cough and shortness of breath  negative for - sputum changes, stridor or wheezing  Cardiovascular ROS: positive for - dyspnea on exertion and edema  negative for - chest pain  Gastrointestinal ROS: no abdominal pain, change in bowel habits, or black or bloody stools  Genito-Urinary ROS: positive for - urinary frequency/urgency  Musculoskeletal ROS: positive for - pain in back - lower  Neurological ROS: positive for - gait disturbance, numbness/tingling and weakness  negative for - dizziness, headaches, seizures, speech problems or tremors      Physical Exam  Vital signs:   Vitals:    08/30/18 0913   BP: 96/63   Pulse: 99   Resp: 20   Temp: 96.7 °F (35.9 °C)   TempSrc: Oral   SpO2: 98%   Weight: 164 lb 3.2 oz (74.5 kg)   Height: 6' 2\" (1.88 m)       General: alert, oriented, not in distress, ambulates with a cane  Head: scalp normal, atraumatic  Eyes: pupils are equal and reactive, full and intact EOM's  Neck: supple, no JVD, no lymphadenopathy, non-palpable thyroid  Chest/Lungs: clear breath sounds, no wheezing or crackles  Heart: normal rate, regular rhythm, no murmur  Abdomen: soft, non-distended, non-tender, normal bowel sounds, no organomegaly, no masses  : (+) soft mass on the left inguinal and suprapubic area  Cervical: Neck is midline. Normal muscle tone. No focal atrophy is noted. ROM pain free. Thoracic: No rash, ecchymosis, or gross obliquity. No fasciculations. No focal atrophy is noted. No tenderness to palpation. Lumbar: No rash, ecchymosis, or gross obliquity. No fasciculations. No focal atrophy is noted. No pain with hip ROM. Full range of motion. No tenderness to palpation. Musculoskeletal: strength 5/5 on both upper extremities. Strength decreased on bilateral hip flexion, normal on knee flexion. extension and dorsi/plantar flexion. Sensation in the bilateral arms grossly intact to light touch. Sensation in the bilateral legs grossly intact to light touch. Extremities: no focal deformities, no edema  Skin: no active skin lesions  Neuro: AAOx3, ataxic gait, CNs grossly normal    Laboratory/Tests:  MRI Lumbar spine 1/2017     History: Degenerative disc disease, back pain and bilateral leg pain     Comparison: No prior studies     Technique: Multiplanar multi sequence MR imaging of the lumbar spine was  performed utilizing sagittal T1, T2, STIR, and axial T2 and T1 sequences.     Findings:      Slight L5-S1 retrolisthesis is present by estimated 2 mm, respectively  degenerative basis given the moderate disc height loss and osteophyte formation. No other level of subluxation is seen. Prominent facet degeneration is seen at  other levels particularly L3-4 and L4-5 levels. There is minimal abnormal marrow  signal, T1 hypointense and STIR hyperintense, suggested along the L4-5 facet  joints which appear to have small subchondral cysts. No araceli joint effusions  identified. Osseous marrow signal is otherwise within normal limits, including  a few scattered T1 and T2 hyperintense lesions most consistent with  hemangiomata. A pars defect is not seen. The conus medullaris is located at  the L1 level and has a normal appearance and signal.     L1/2 level: Very mild facet arthropathy is seen.  There is no central or  foraminal stenosis.     L2/3 level: Mild facet arthropathy is seen. There is no central or foraminal  stenosis.     L3/4 level: Moderate facet arthropathy is present. There is no central or  foraminal stenosis.     L4/5 level: Mild disc bulge is present. At least moderate facet arthropathy is  present at this level. Small right foraminal disc protrusion with T2  hyperintense zone/fissure is seen. No definite mass effect on the exiting right  L4 nerve root is seen. Ventral thecal sac is slightly flattened without central  stenosis.     L5/S1 level: Disc bulge is present along with endplate osteophyte formation,  slightly uncovered by retrolisthesis. There is associated moderate bilateral  foraminal stenosis present. The exiting L5 nerve roots may be contacted,  particularly on the right, without definite deformity. No central stenosis is  produced. Mild facet arthropathy seen at this level.     Poorly visualized T2 hyperintensity and hypointense/intermediate T1 signal is  seen along the ventral and superior margin of the right kidney, as seen on axial  images 1-3. Finding is estimated 4.2 cm in diameter, and is not clearly a cyst.  Unfortunately there is artifact present in this location.     IMPRESSION  IMPRESSION:     1. Small right foraminal L4-5 disc protrusion superimposed on a disc bulge  without definite mass effect on the exiting right L4 nerve root. Clinical  correlation with possible right L4 radiculopathy is recommended.     2. Degenerative endplate changes greatest at L5-S1 level, associated with mild  retrolisthesis. Moderate bilateral foraminal stenosis, abutting the exiting L5  nerve roots, particularly on the right, without definite mass effect. Clinical  correlation with possible L5 radiculopathy is recommended.     3. Prominent facet degeneration is present lower lumbar spine. The L4-5 level  where there is minimal abnormal marrow signal, likely degenerative inflammatory  changes.  No araceli joint effusions to suggest facet synovitis.     4. Incompletely evaluated abnormal signal, T2 hyperintense along the superior  aspect of the right kidney, somewhat obscured by artifact, not clearly a cyst,  with solid renal lesion not excluded. No prior comparison study. Follow-up renal  ultrasound is recommended. Assessment/Plan:      1. Non-recurrent unilateral inguinal hernia without obstruction or gangrene  - REFERRAL TO GENERAL SURGERY    2. Essential hypertension with goal blood pressure less than 140/90  - controlled, BP low  - decrease lisinopril-HCTZ to 0.5 tabs daily  - CBC WITH AUTOMATED DIFF; Future  - HEMOGLOBIN A1C WITH EAG; Future  - LIPID PANEL; Future  - METABOLIC PANEL, COMPREHENSIVE; Future  - TSH 3RD GENERATION; Future  - URINALYSIS W/ RFLX MICROSCOPIC; Future    3. Chronic midline low back pain with right-sided sciatica  - NSAID as needed    4. Gait disturbance  - referral to physical therapy  - will prescribed walker  - VITAMIN B12 & FOLATE; Future  - VITAMIN D, 25 HYDROXY; Future  - REFERRAL TO NEUROLOGY    5. Recurrent falls  - referral to physical therapy  - will prescribed walker  - VITAMIN B12 & FOLATE; Future  - VITAMIN D, 25 HYDROXY; Future  - REFERRAL TO NEUROLOGY    6. Urinary urgency  - PROSTATE SPECIFIC AG; Future    7. Unintended weight loss  - HIV 1/2 AG/AB, 4TH GENERATION,W RFLX CONFIRM; Future    Follow-up Disposition:  Return in about 4 weeks (around 9/27/2018) for rov. I have discussed the diagnosis with the patient and the intended plan as seen in the above orders. The patient has received an after-visit summary and questions were answered concerning future plans. I have discussed medication side effects and warnings with the patient as well. I have reviewed the plan of care with the patient, accepted their input and they are in agreement with the treatment goals.        Nico Leigh MD  August 30, 2018

## 2018-08-30 NOTE — MR AVS SNAPSHOT
Gt Hess 
 
 
 [address] [facility] [address] [address] 
[phone] Patient: Michael Mccracken MRN: [id number] PHY:6/5/8801 Visit Information Date & Time Provider Department Dept. Phone Encounter #  
 8/30/2018  9:15 AM Daisy Nina, Charly6 [facility] [phone] [id number] Follow-up Instructions Return in about 4 weeks (around 9/27/2018) for rov. Upcoming Health Maintenance Date Due Pneumococcal 19-64 Medium Risk (1 of 1 - PPSV23) 1/7/1973 DTaP/Tdap/Td series (1 - Tdap) 1/7/1975 FOBT Q 1 YEAR AGE 50-75 1/7/2004 ZOSTER VACCINE AGE 60> 11/7/2013 Influenza Age 5 to Adult 8/1/2018 Allergies as of 8/30/2018  Review Complete On: 8/30/2018 By: Daisy Nina MD  
  
 Severity Noted Reaction Type Reactions Codeine  06/28/2016    Swelling Tramadol  06/14/2017    Other (comments) Lightheadedness, \"high\" Current Immunizations  Never Reviewed No immunizations on file. Not reviewed this visit You Were Diagnosed With   
  
 Codes Comments Non-recurrent unilateral inguinal hernia without obstruction or gangrene    -  Primary ICD-10-CM: K40.90 ICD-9-CM: 550.90 Essential hypertension with goal blood pressure less than 140/90     ICD-10-CM: I10 
ICD-9-CM: 401.9 Chronic midline low back pain with right-sided sciatica     ICD-10-CM: M54.41, G89.29 ICD-9-CM: 724.2, 724.3, 338.29 Gait disturbance     ICD-10-CM: R26.9 ICD-9-CM: 781.2 Recurrent falls     ICD-10-CM: R29.6 ICD-9-CM: V15.88 Urinary urgency     ICD-10-CM: R39.15 ICD-9-CM: 782.24 Unintended weight loss     ICD-10-CM: R63.4 ICD-9-CM: 783.21 Vitals BP Pulse Temp Resp Height(growth percentile) Weight(growth percentile) 96/63 99 96.7 °F (35.9 °C) (Oral) 20 6' 2\" (1.88 m) 164 lb 3.2 oz (74.5 kg) SpO2 BMI Smoking Status 98% 21.08 kg/m2 Current Every Day Smoker BMI and BSA Data Body Mass Index Body Surface Area 21.08 kg/m 2 1.97 m 2 Preferred Pharmacy Pharmacy Name Phone [facility], [address] [facility] [phone] Your Updated Medication List  
  
   
This list is accurate as of 8/30/18  9:42 AM.  Always use your most recent med list.  
  
  
  
  
 acetaminophen 500 mg tablet Commonly known as:  [address] Take 2 Tabs by mouth every six (6) hours as needed for Pain. albuterol 90 mcg/actuation inhaler Commonly known as:  PROVENTIL HFA, VENTOLIN HFA, PROAIR HFA Take 2 Puffs by inhalation every six (6) hours as needed for Wheezing or Shortness of Breath. budesonide-formoterol 160-4.5 mcg/actuation Hfaa Commonly known as:  SYMBICORT Take 2 Puffs by inhalation two (2) times a day. DULoxetine 40 mg Cpdr  
Take 40 mg by mouth daily. etodolac 400 mg tablet Commonly known as:  LODINE Take 400 mg by mouth two (2) times daily (with meals). finasteride 5 mg tablet Commonly known as:  PROSCAR Take 1 Tab by mouth daily. gabapentin 300 mg capsule Commonly known as:  NEURONTIN Take 2 Caps by mouth three (3) times daily. Indications: NEUROPATHIC PAIN  
  
 lisinopril-hydroCHLOROthiazide 20-25 mg per tablet Commonly known as:  Sotero Kemps Take 1 Tab by mouth daily. potassium chloride 20 mEq tablet Commonly known as:  K-DUR, KLOR-CON Take 1 Tab by mouth daily. sildenafil citrate 100 mg tablet Commonly known as:  VIAGRA Take 1 Tab by mouth as needed. Theta Álvarez Use daily as needed Prescriptions Printed Refills Walker misc 0 Sig: Use daily as needed Class: Print We Performed the Following REFERRAL TO GENERAL SURGERY [REF27 Custom] Comments:  
 63/M with Left inguinal hernia REFERRAL TO NEUROLOGY [UBQ29 Custom]  Comments:  
 63/M with degenerative lumbar disc disease, hypertension, with gait disturbance, recurrent falls. REFERRAL TO PHYSICAL THERAPY [KXG24 Custom] Comments: Low back pain, recurrent falls Follow-up Instructions Return in about 4 weeks (around 9/27/2018) for rov. To-Do List   
 08/30/2018 Lab:  CBC WITH AUTOMATED DIFF   
  
 08/30/2018 Lab:  HEMOGLOBIN A1C WITH EAG   
  
 08/30/2018 Lab:  HIV 1/2 AG/AB, 4TH GENERATION,W RFLX CONFIRM   
  
 08/30/2018 Lab:  LIPID PANEL   
  
 08/30/2018 Lab:  METABOLIC PANEL, COMPREHENSIVE   
  
 08/30/2018 Lab:  PSA, DIAGNOSTIC (PROSTATE SPECIFIC AG)   
  
 08/30/2018 Lab:  TSH 3RD GENERATION Around 08/30/2018 Lab:  URINALYSIS W/ RFLX MICROSCOPIC   
  
 08/30/2018 Lab:  VITAMIN B12 & FOLATE   
  
 08/30/2018 Lab:  VITAMIN D, 25 HYDROXY Referral Information Referral ID Referred By Referred To  
  
 [id number] Jenice [facility] Not Available Visits Status Start Date End Date 1 New Request 8/30/18 8/30/19 If your referral has a status of pending review or denied, additional information will be sent to support the outcome of this decision. Referral ID Referred By Referred To  
 [id number] Anice [facility] Not Available Visits Status Start Date End Date 1 New Request 8/30/18 8/30/19 If your referral has a status of pending review or denied, additional information will be sent to support the outcome of this decision. Referral ID Referred By [facility]  
 [id number] [facility]  
   Kalaniida H[address] [place] [address] [facility], [address] Phone: [phone] Fax: [phone] Visits Status Start Date End Date 1 New Request 8/30/18 8/30/19 If your referral has a status of pending review or denied, additional information will be sent to support the outcome of this decision. Introducing [facility]! Dear Mario Bourne: Thank you for requesting a [employer] account.   Our records indicate that you already have an active [employer] account. You can access your account anytime at [email or web address] Did you know that you can access your hospital and ER discharge instructions at any time in [employer]? You can also review all of your test results from your hospital stay or ER visit. Additional Information If you have questions, please visit the Frequently Asked Questions section of the [employer] website at [email or web address] Remember, [employer] is NOT to be used for urgent needs. For medical emergencies, dial 911. Now available from your iPhone and Android! Please provide this summary of care documentation to your next provider. Your primary care clinician is listed as Nico Leigh. If you have any questions after today's visit, please call [phone].

## 2018-08-31 LAB
25(OH)D3 SERPL-MCNC: 75.2 NG/ML (ref 30–100)
HIV 1+2 AB+HIV1 P24 AG SERPL QL IA: NONREACTIVE
HIV12 RESULT COMMENT, HHIVC: NORMAL

## 2018-09-13 ENCOUNTER — APPOINTMENT (OUTPATIENT)
Dept: PHYSICAL THERAPY | Age: 63
End: 2018-09-13

## 2018-10-01 ENCOUNTER — DOCUMENTATION ONLY (OUTPATIENT)
Dept: FAMILY MEDICINE CLINIC | Age: 63
End: 2018-10-01

## 2018-10-01 NOTE — LETTER
10/1/2018 Madhavi Leonardo Teodoro  
[address] [address] [address] Dear Mr. Cheryle Neri, We had an appointment reserved for you on 9/27/18 and were concerned when you did not show or call within 24 hours to cancel the appointment. As indicated in a previous letter, we will no longer be able to schedule appointments for you in advance. If you need to make an appointment, please call the office and we will attempt to schedule, but cannot guarantee, a work-in appointment. In order to provide optimal care to you, we expect you to make it to your appointments. Regrettably, if you miss a work-in appointment, we may have to discharge you from the practice. Sincerely, [address] [place] [address] [phone]

## 2022-03-19 PROBLEM — R39.15 URINARY URGENCY: Status: ACTIVE | Noted: 2017-06-07

## 2022-03-19 PROBLEM — R26.9 GAIT DISTURBANCE: Status: ACTIVE | Noted: 2017-08-14

## 2022-03-19 PROBLEM — N28.1 COMPLEX RENAL CYST: Status: ACTIVE | Noted: 2017-06-14

## 2023-05-20 RX ORDER — DULOXETINE 40 MG/1
40 CAPSULE, DELAYED RELEASE ORAL DAILY
COMMUNITY
Start: 2017-11-13

## 2023-05-20 RX ORDER — ALBUTEROL SULFATE 90 UG/1
2 AEROSOL, METERED RESPIRATORY (INHALATION) EVERY 6 HOURS PRN
COMMUNITY
Start: 2017-11-13

## 2023-05-20 RX ORDER — SILDENAFIL 100 MG/1
100 TABLET, FILM COATED ORAL PRN
COMMUNITY
Start: 2017-11-13

## 2023-05-20 RX ORDER — BUDESONIDE AND FORMOTEROL FUMARATE DIHYDRATE 160; 4.5 UG/1; UG/1
2 AEROSOL RESPIRATORY (INHALATION) 2 TIMES DAILY
COMMUNITY
Start: 2017-11-13

## 2023-05-20 RX ORDER — ACETAMINOPHEN 500 MG
1000 TABLET ORAL EVERY 6 HOURS PRN
COMMUNITY
Start: 2017-06-07

## 2023-05-20 RX ORDER — LISINOPRIL AND HYDROCHLOROTHIAZIDE 25; 20 MG/1; MG/1
1 TABLET ORAL DAILY
COMMUNITY
Start: 2017-11-13

## 2023-05-20 RX ORDER — FINASTERIDE 5 MG/1
5 TABLET, FILM COATED ORAL DAILY
COMMUNITY
Start: 2017-11-13

## 2023-05-20 RX ORDER — POTASSIUM CHLORIDE 20 MEQ/1
20 TABLET, EXTENDED RELEASE ORAL DAILY
COMMUNITY
Start: 2017-11-13

## 2023-05-20 RX ORDER — ETODOLAC 400 MG/1
400 TABLET, FILM COATED ORAL 2 TIMES DAILY WITH MEALS
COMMUNITY
Start: 2017-11-13

## 2023-05-20 RX ORDER — GABAPENTIN 300 MG/1
600 CAPSULE ORAL 3 TIMES DAILY
COMMUNITY
Start: 2017-11-13